# Patient Record
Sex: MALE | Race: WHITE | NOT HISPANIC OR LATINO | Employment: OTHER | URBAN - METROPOLITAN AREA
[De-identification: names, ages, dates, MRNs, and addresses within clinical notes are randomized per-mention and may not be internally consistent; named-entity substitution may affect disease eponyms.]

---

## 2023-11-13 ENCOUNTER — OFFICE VISIT (OUTPATIENT)
Dept: INTERNAL MEDICINE | Facility: CLINIC | Age: 58
End: 2023-11-13
Payer: MEDICARE

## 2023-11-13 VITALS
HEART RATE: 72 BPM | SYSTOLIC BLOOD PRESSURE: 106 MMHG | TEMPERATURE: 97.3 F | HEIGHT: 65 IN | BODY MASS INDEX: 19.76 KG/M2 | DIASTOLIC BLOOD PRESSURE: 68 MMHG | WEIGHT: 118.6 LBS | RESPIRATION RATE: 18 BRPM

## 2023-11-13 DIAGNOSIS — E04.1 THYROID NODULE: ICD-10-CM

## 2023-11-13 DIAGNOSIS — Z12.5 ENCOUNTER FOR PROSTATE CANCER SCREENING: ICD-10-CM

## 2023-11-13 DIAGNOSIS — M25.511 ACUTE PAIN OF RIGHT SHOULDER: ICD-10-CM

## 2023-11-13 DIAGNOSIS — M54.6 MIDLINE THORACIC BACK PAIN, UNSPECIFIED CHRONICITY: ICD-10-CM

## 2023-11-13 DIAGNOSIS — R05.3 CHRONIC COUGH: Primary | ICD-10-CM

## 2023-11-13 DIAGNOSIS — R39.198 DECREASED URINE STREAM: ICD-10-CM

## 2023-11-13 DIAGNOSIS — N32.89 BLADDER WALL THICKENING: ICD-10-CM

## 2023-11-13 DIAGNOSIS — R63.4 WEIGHT LOSS: ICD-10-CM

## 2023-11-13 LAB
BILIRUB BLD-MCNC: NEGATIVE MG/DL
CLARITY, POC: CLEAR
COLOR UR: YELLOW
EXPIRATION DATE: NORMAL
GLUCOSE UR STRIP-MCNC: NEGATIVE MG/DL
KETONES UR QL: NEGATIVE
LEUKOCYTE EST, POC: NEGATIVE
Lab: NORMAL
NITRITE UR-MCNC: NEGATIVE MG/ML
PH UR: 7 [PH] (ref 5–8)
PROT UR STRIP-MCNC: NEGATIVE MG/DL
PSA SERPL-MCNC: 1.24 NG/ML (ref 0–4)
RBC # UR STRIP: NEGATIVE /UL
SP GR UR: 1.01 (ref 1–1.03)
T3 SERPL-MCNC: 82 NG/DL (ref 80–200)
TSH SERPL DL<=0.05 MIU/L-ACNC: 0.61 UIU/ML (ref 0.27–4.2)
UROBILINOGEN UR QL: NORMAL

## 2023-11-13 PROCEDURE — 84480 ASSAY TRIIODOTHYRONINE (T3): CPT | Performed by: NURSE PRACTITIONER

## 2023-11-13 PROCEDURE — 99204 OFFICE O/P NEW MOD 45 MIN: CPT | Performed by: NURSE PRACTITIONER

## 2023-11-13 PROCEDURE — 81003 URINALYSIS AUTO W/O SCOPE: CPT | Performed by: NURSE PRACTITIONER

## 2023-11-13 PROCEDURE — 84443 ASSAY THYROID STIM HORMONE: CPT | Performed by: NURSE PRACTITIONER

## 2023-11-13 PROCEDURE — G0103 PSA SCREENING: HCPCS | Performed by: NURSE PRACTITIONER

## 2023-11-13 RX ORDER — DOXAZOSIN MESYLATE 4 MG/1
4 TABLET ORAL NIGHTLY
Qty: 30 TABLET | Refills: 1 | Status: SHIPPED | OUTPATIENT
Start: 2023-11-13

## 2023-11-13 RX ORDER — NAPROXEN SODIUM 220 MG
220 TABLET ORAL 2 TIMES DAILY PRN
COMMUNITY

## 2023-11-13 RX ORDER — TADALAFIL 5 MG/1
5 TABLET ORAL DAILY PRN
Qty: 30 TABLET | Refills: 2 | Status: SHIPPED | OUTPATIENT
Start: 2023-11-13

## 2023-11-13 NOTE — PROGRESS NOTES
"  New Patient Office Visit      Patient Name: Aashish Maldonado  : 1965   MRN: [id number]     Chief Complaint:    Chief Complaint   Patient presents with    Shoulder Pain       History of Present Illness: Aashish Maldonado is a 58 y.o. male presents to clinic today to establish care. He was evaluated in the emergency department for near-syncope on 2023, acute pancreatitis on 10/03/2023, and right shoulder/back pain on 2023.    Right shoulder/neck/thoracic back pain  On 2023, the patient presented to the emergency department complaining of right shoulder pain for several days. He believed that on the night of 2023 his right \"artificial\" shoulder dislocated, which he reduced himself. He noted progressively worsening pain in the right shoulder/neck/thoracic back since then. His right shoulder x-ray revealed no fractures, and cervical spine CT scan demonstrated multilevel degenerative changes. Today, the patient denies being established with an orthopedist. He reports a history of 2 right shoulder arthroplasties. 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 His \"whole arm just went down,\" and he could not move his right upper extremity. He smokes marijuana for pain; however, this was unhelpful, and his right shoulder pain progressively worsened. He reports intermittent arm numbness/tingling. The patient notes pain radiating from his neck to posterior head, described as the sensation of blood rushing, and notes that his neck pain seems to have been aggravated by re-injuring his right shoulder. He denies associated nausea or emesis. He experiences occasional stabbing, thoracic back pain which radiates through bilateral shoulders and into his neck. His pain affects his entire right shoulder and radiates into his triceps. The patient is scheduled for evaluation with neurosurgery on 2023. He requests referral to address his right shoulder issues. He attempted to perform " "right shoulder exercises with pulleys and bands and could not complete them.    Acute pancreatitis  The patient presented to the emergency department on 10/03/2023 with right abdominal/flank pain and was diagnosed with acute pancreatitis. He had a prior history of acute pancreatitis. Today, he states that his abdominal pain has resolved, and he is drinking and eating adequately. He reports that the suspected cause of his pancreatitis was past excess alcohol consumption.    Bilateral thyroid nodules  The patient reports undergoing neck and head CT angiograms in [place], [place], due to injury/shooting pain. He was advised to follow up with someone for \"nodules on [his] thyroid.\" He notes that he weighed 180 pounds 2 years ago and weighs approximately 118 pound today. The patient notes stress over the past 2 years, including a divorce; however, he reports eating and not re-gaining weight. He has dysphagia, which he attributes to prior Nissen fundoplication. He cannot swallow \"regular pills\" and expects to require repeat esophageal dilation, which was last performed over 3 to 4 years ago. The patient notes recent difficulty consuming steak. He denies nausea, emesis, and is in \"good spirits.\" He reports receiving his last COVID-19 vaccine at approximately the time he began to lose weight.    Bladder wall thickening, decreased urine stream  The patient's abdominal/pelvic CT scan on 10/03/2023 demonstrated bladder wall thickening. He reports normal PSAs in the past. He states that in spring of 2023 in [place], he was evaluated by a urologist, underwent testing, and UroLift or other similar procedure was planned. The patient restarted smoking marijuana and did not go through with the urology procedure. He requests urology referral. He denies hematuria. The patient notes intermittent decreased urine stream. He has taken tadalafil alone as well as tadalafil and doxazosin and states that his urine stream improved when he " took both tadalafil 5 mg daily and doxazosin 8 mg daily. He requests refills. The patient reports taking doxazosin 4 mg daily initially. He denies hypotension and reports tolerating doxazosin.    Past medical history  The patient denies any history of cancer. He took buspirone regularly in the past and currently feels well without this.    Past surgical history  The patient reports undergoing 2 wrist surgeries which involved screws prior to fusion. He confirms adequate sensation in his right hand/wrist. He reports undergoing prior colonoscopy and being recommended 5-year followup. The patient has undergone multiple esophagogastroduodenoscopy including with his last colonoscopy.    Social history  The patient is officially disabled/retired and works in construction. He recently restarted smoking marijuana for pain and is trying to switch to edibles. He smokes cigarettes and notes an associated chronic intermittent cough. The patient denies ever undergoing low-dose chest CT scan for lung cancer screening. He denies significant dyspnea with ambulation. He denies current alcohol intake and notes 20 years of alcohol sobriety, aside from 3 lapses of less than 1 week.       Subjective     Review of System: Review of Systems   Constitutional:  Positive for unexpected weight change.   HENT:  Positive for trouble swallowing.    Respiratory:  Positive for cough.    Genitourinary:  Positive for decreased urine volume.   Musculoskeletal:         Positive for right shoulder/neck/thoracic back pain.   Neurological:  Positive for numbness.      A review of systems was performed, and the pertinent positives are noted in the HPI.      Past Medical History:   Past Medical History:   Diagnosis Date    Acid reflux     ADHD     Double vision     PTSD (post-traumatic stress disorder)     Urinary retention        Past Surgical History:   Past Surgical History:   Procedure Laterality Date    ESOPHAGUS SURGERY      EYE SURGERY      WRIST  "FUSION         Family History:   Family History   Problem Relation Age of Onset    Diabetes Father        Social History:   Social History     Socioeconomic History    Marital status:    Tobacco Use    Smoking status: Every Day     Packs/day: 0.50     Years: 40.00     Additional pack years: 0.00     Total pack years: 20.00     Types: Cigarettes    Smokeless tobacco: Never   Substance and Sexual Activity    Alcohol use: Never    Drug use: Yes     Types: Marijuana, Amphetamines    Sexual activity: Defer       Medications:     Current Outpatient Medications:     naproxen sodium (ALEVE) 220 MG tablet, Take 1 tablet by mouth 2 (Two) Times a Day As Needed., Disp: , Rfl:     doxazosin (Cardura) 4 MG tablet, Take 1 tablet by mouth Every Night., Disp: 30 tablet, Rfl: 1    tadalafil (Cialis) 5 MG tablet, Take 1 tablet by mouth Daily As Needed for Erectile Dysfunction., Disp: 30 tablet, Rfl: 2    Allergies:   Allergies   Allergen Reactions    Dextromethorphan Hives       Objective     Physical Exam:   Vital Signs:   Vitals:    11/13/23 1032   BP: 106/68   BP Location: Right arm   Patient Position: Sitting   Cuff Size: Adult   Pulse: 72   Resp: 18   Temp: 97.3 °F (36.3 °C)   TempSrc: Infrared   Weight: 53.8 kg (118 lb 9.6 oz)   Height: 164.5 cm (64.75\")   PainSc:   7     Body mass index is 19.89 kg/m². BMI is within normal parameters. No other follow-up for BMI required.      Physical Exam  Constitutional:       General: He is not in acute distress.     Appearance: He is not ill-appearing.   HENT:      Head: Normocephalic.   Cardiovascular:      Rate and Rhythm: Normal rate and regular rhythm.      Heart sounds: Normal heart sounds. No murmur heard.  Pulmonary:      Comments: Diminished breath sounds  Abdominal:      General: Bowel sounds are normal.      Tenderness: There is no abdominal tenderness.   Musculoskeletal:      Comments: Generalized tenderness of right shoulder with decreased range of motion, flexion to 90 " degrees. Brisk capillary refill in bilateral extremities and sensation is intact. Upper thoracic spine tenderness.   Neurological:      General: No focal deficit present.      Mental Status: He is oriented to person, place, and time.   Psychiatric:         Mood and Affect: Mood normal.         Assessment / Plan      Assessment/Plan:   Diagnoses and all orders for this visit:    1. Chronic cough (Primary)  -     CT Chest Without Contrast; Future    2. Acute pain of right shoulder  -     Ambulatory Referral to Orthopedic Surgery    3. Thyroid nodule  -     TSH Rfx On Abnormal To Free T4; Future  -     T3; Future  -     Cancel: US Thyroid  -     US Thyroid; Future  -     TSH Rfx On Abnormal To Free T4  -     T3    4. Weight loss  -     CT Chest Without Contrast; Future    5. Encounter for prostate cancer screening  -     PSA SCREENING; Future  -     PSA SCREENING    6. Decreased urine stream  -     POC Urinalysis Dipstick, Automated  -     tadalafil (Cialis) 5 MG tablet; Take 1 tablet by mouth Daily As Needed for Erectile Dysfunction.  Dispense: 30 tablet; Refill: 2  -     doxazosin (Cardura) 4 MG tablet; Take 1 tablet by mouth Every Night.  Dispense: 30 tablet; Refill: 1  -     Ambulatory Referral to Urology    7. Bladder wall thickening  -     Ambulatory Referral to Urology    8. Midline thoracic back pain, unspecified chronicity  -     XR Spine Thoracic 3 View; Future         1. Right shoulder pain  - His right shoulder x-ray on 11/07/2023 revealed postoperative changes, mild acromioclavicular joint degenerative changes, no soft tissue abnormality, no foreign body, no evidence of acute fracture or   dislocation.  - Orthopedic surgery referral was placed.    2. Neck pain  - Cervical spine CT scan on 11/07/2023 demonstrated grade 1 anterolisthesis at C2 on C3, moderate to   severe degenerative changes in the cervical spine spanning C3 to C7, osseous fusion of the left facets of C2 and C3, facet arthropathy in the  right side at C2-C3, advanced facet arthropathy at C4-C5 especially on the left side, mild central spinal canal narrowing suggested at C5-C6, with anteroposterior measurement of 8 mm, multilevel neuroforaminal stenosis.   - He will follow up with neurosurgery as scheduled on 11/14/2023.    3. Thoracic back pain  - Thoracic spine x-ray will be obtained.    4. History of pancreatitis  - CT scan of the abdomen and pelvis with contrast on 10/03/2023 demonstrated liver without suspicious focal hepatic lesion, unremarkable gallbladder, no significant biliary ductal dilatation. Bilateral adrenal glands were unremarkable without suspicious lesion. There were no suspicious renal lesions, no hydronephrosis. There was mild urinary bladder wall thickening and a large bladder diverticulum arising from the posterior aspect of the bladder measuring approximately 6.6 x 4.4 cm. No pathologically enlarged abdominal or pelvic lymph nodes were present.  No evidence of bowel obstruction. Appendix was unremarkable. There was a relative paucity of abdominal and retroperitoneal fat though maybe some mild peripancreatic inflammation. Peritoneum and pelvic viscera revealed no acute findings. Bones demonstrated to acute fracture.     5. Bilateral thyroid nodules  - CT angiogram of the neck on 08/03/2023 demonstrated small bilateral isodense and hypodense thyroid gland nodules.  - Thyroid function studies and thyroid ultrasound will be completed. Scheduling phone number was provided.    6. Bladder wall thickening, decreased urine stream  - Urinalysis and PSA will be completed.  - Urology referral was placed.  - Tadalafil 5 mg daily and doxazosin 4 mg daily were prescribed. Potential side effect of hypotension was discussed. He was advised to start tadalafil alone for a few days, then start doxazosin, and monitor for hypotension.    7. Unintentional weight loss  - Chest CT scan was ordered since symptomatic cough and high risk  based on tobacco  use history.  Chest xray: 10/3/23: Emphysema. No dense consolidation. No pneumothorax or pleural effusion.   Normal heart size.     8. Chronic cough  - Chest x-ray on 10/03/2023 demonstrated emphysema, no dense consolidation, no pneumothorax or pleural effusion.    9. Tobacco use  - Smoking cessation was recommended. He declines nicotine patches or other tobacco cessation aids at this time.    10. Health maintenance  - Laboratory studies were recently completed in the emergency department. The patient will follow up in 1 month for annual physical examination and repeat laboratory studies if needed.    I explained and discussed patient's condition and plan of care.  Discussed when to follow-up.  Discussed possible red flags and how to follow-up with those.  Viewed patient's medications and discussed common side effects. Patient to continue current medications as advised.  Be compliant with medications. Patient to let me know if he has any changes in health, does not tolerate medication, or any future concerns about treatment. Patient verbalized understanding and agreement with plan of care.   Patient given the scheduling number, [phone] for tests, or [phone] for internal referrals.  If they do not receive a call for their test in 1-2 weeks they should call scheduling. Scheduling will make three attempts to reach the patient, if unable to reach the patient after 3 attempts, the order will be canceled. Patient verbalized an understanding and agreement with plan.    Follow Up:   Return in about 4 weeks (around 12/11/2023) for Annual.    HARI Jones  [facility]    Transcribed from ambient dictation for HARI Jones by Courtney Woodard.  11/13/23   14:48 EST    Patient or patient representative verbalized consent to the visit recording.  I have personally performed the services described in this document as transcribed by the above individual, and it is both  accurate and complete.

## 2023-11-14 ENCOUNTER — OFFICE VISIT (OUTPATIENT)
Dept: ORTHOPEDIC SURGERY | Facility: CLINIC | Age: 58
End: 2023-11-14
Payer: MEDICARE

## 2023-11-14 VITALS
WEIGHT: 118 LBS | HEIGHT: 65 IN | DIASTOLIC BLOOD PRESSURE: 84 MMHG | BODY MASS INDEX: 19.66 KG/M2 | SYSTOLIC BLOOD PRESSURE: 110 MMHG

## 2023-11-14 DIAGNOSIS — Z72.0 TOBACCO USE: ICD-10-CM

## 2023-11-14 DIAGNOSIS — M47.812 CERVICAL SPINE ARTHRITIS: ICD-10-CM

## 2023-11-14 DIAGNOSIS — M25.511 ACUTE PAIN OF RIGHT SHOULDER: Primary | ICD-10-CM

## 2023-11-14 DIAGNOSIS — Z98.890 HISTORY OF REVERSE TOTAL REPLACEMENT OF RIGHT SHOULDER JOINT: ICD-10-CM

## 2023-11-14 NOTE — PROGRESS NOTES
[facility] Office Visit - Pamella Dean PA-C    Office Visit       Patient Name: Aashish Maldonado    Chief Complaint:   Chief Complaint   Patient presents with    Right Shoulder - Pain       Referring Physician: Anna Gamez APRN    History of Present Illness:   Aashish Maldonado is a 58 y.o. male who presents with right shoulder pain.  Ongoing for the last couple weeks.  Rates pain an 8/10.  Described as aching, stabbing, shooting pain.  Associated with stiffness.  Worsens with any movement of the joint.  He has tried ibuprofen, Aleve, Tylenol, Flexeril, diclofenac for the pain.  He has had 2 previous shoulder surgeries with Dr. Portillo in [facility].  Original total shoulder arthroplasty in 2019.  He then had right total shoulder conversion to reverse a couple years later.  He has not been back to see Dr. Portillo recently.    He reports on 11/4/2023 he was working for several hours on house projects when he felt a sudden pain in the right shoulder.  He had been working for almost 48 hours straight. He does not recall a specific injury.  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  He went to the [facility] emergency department at that time.  He was encouraged to follow-up with a neurosurgeon due to C-spine degenerative changes found at that time.  He says he has an appointment with a neurosurgeon next week.    Currently not working full time.  He moved to [place] earlier this year.      Subjective     Review of Systems   Constitutional: Negative.  Negative for chills, fatigue and fever.   HENT: Negative.  Negative for congestion and dental problem.    Eyes: Negative.  Negative for blurred vision.   Respiratory: Negative.  Negative for shortness of breath.    Cardiovascular: Negative.  Negative for leg swelling.   Gastrointestinal: Negative.  Negative for abdominal pain.   Endocrine: Negative.  Negative for polyuria.    Genitourinary: Negative.  Negative for difficulty urinating.   Musculoskeletal:  Positive for arthralgias.   Skin: Negative.    Allergic/Immunologic: Negative.    Neurological: Negative.    Hematological: Negative.  Negative for adenopathy.   Psychiatric/Behavioral: Negative.  Negative for behavioral problems.         Past Medical History:   Past Medical History:   Diagnosis Date    Acid reflux     ADHD     Double vision     PTSD (post-traumatic stress disorder)     Urinary retention        Past Surgical History:   Past Surgical History:   Procedure Laterality Date    ESOPHAGUS SURGERY      EYE SURGERY      WRIST FUSION         Family History:   Family History   Problem Relation Age of Onset    Diabetes Father        Social History:   Social History     Socioeconomic History    Marital status:    Tobacco Use    Smoking status: Every Day     Packs/day: 0.50     Years: 40.00     Additional pack years: 0.00     Total pack years: 20.00     Types: Cigarettes    Smokeless tobacco: Never   Substance and Sexual Activity    Alcohol use: Never    Drug use: Yes     Types: Marijuana, Amphetamines    Sexual activity: Defer       Medications:   Current Outpatient Medications:     doxazosin (Cardura) 4 MG tablet, Take 1 tablet by mouth Every Night., Disp: 30 tablet, Rfl: 1    naproxen sodium (ALEVE) 220 MG tablet, Take 1 tablet by mouth 2 (Two) Times a Day As Needed., Disp: , Rfl:     tadalafil (Cialis) 5 MG tablet, Take 1 tablet by mouth Daily As Needed for Erectile Dysfunction., Disp: 30 tablet, Rfl: 2    Allergies:   Allergies   Allergen Reactions    Dextromethorphan Hives       I have reviewed and updated the following portions of the patient's history and review of systems: allergies, current medications, past family history, past medical history, past social history, past surgical history and problem list.    Objective      Vital Signs:   Vitals:    11/14/23 0930   BP: 110/84   Weight: 53.5 kg (118 lb)   Height:  "164.5 cm (64.76\")       Ortho Exam:  General: no acute distress, comfortable  Vitals reviewed in chart    Musculoskeletal Exam    SIDE: Right shoulder  Shoulder Exam:    Tenderness: No focal tenderness--diffusely painful    Range of motion measurements (degrees)  Forward flexion/Abduction/External rotation at side/ER at 90/IR at 90/IR position  Active: 120/120/60/60  Passive: 140/120/60/60    Painful arc of motion: Yes, dysrhythmic motion  No evidence of septic joint  Pain with forward flexion and abduction greater: Yes  Impingement test: Painful    Rotator Cuff Testing:  Tenderness to palpation at rotator cuff -yes  Rotator cuff testing Roque's test -painful  Rotator cuff testing External rotation -painful  Rotator cuff testing Lag signs -negative  Pain with abduction great than 90 degrees -yes    Long head of the biceps testing:  Aguilar's test for biceps -negative  Bicipital groove tenderness to palpation -negative for  Speed's test  -negative    AC Joint:  AC joint tenderness to palpation -no  AC joint Prominence -no      Results Review:   XR Shoulder 2+ View Right  Imaging: shoulder x-rays 3 views - AP, axillary, and scapular-Y x-ray   views    Side: Right shoulder    Indication for shoulder x-ray 3 views: shoulder pain    Comparison: None    Findings: No acute bony pathology-no obvious periprosthetic fracture.    Right reverse shoulder arthroplasty show the implants to be located.  No   obvious lucency on the humeral or the glenoid side.  Short canal filling   press-fit stem.  Early inferior glenoid notching.    I personally reviewed the above x-rays.         Assessment / Plan      Assessment:  Diagnoses and all orders for this visit:    1. Acute pain of right shoulder (Primary)  -     XR Shoulder 2+ View Right    2. 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 Cervical spine arthritis    4. Tobacco use        Quality Metrics:   BMI:   BMI is within normal parameters. No other follow-up for " BMI required.       Tobacco:   Aashish Maldonado  reports that he has been smoking cigarettes. He has a 20.00 pack-year smoking history. He has never used smokeless tobacco..     Plan:  X-ray films reviewed today with Dr. Gibson.  Evidence of right reverse total shoulder arthroplasty.  No acute changes.  No obvious fracture.  Clinically he has no focal tenderness of scapular spine or acromion.  I have low suspicion for stress fracture at this time.  No further imaging needed currently.  Possible CT scan if symptoms persist.  He is likely experiencing pain from overuse of right shoulder while working long period. No specific trauma or injury.   Recommend continue anti-inflammatories as needed.  Rest and icing will be critical to allow pain to settle down.  Reviewed emergency department notes from 11/7/2023.  Referred to neurosurgeon at that time due to degenerative changes of C-spine.  He has appointment next week.  He will keep me updated regarding right shoulder pain after he sees neurosurgeon.  Pain is likely radiating from his neck as well.  We will check in again in a couple months to see if further treatment or imaging is needed.      Follow Up:   2-3 months as needed    History, diagnosis and treatment plan discussed with Dr. Gibson.      Pamella Dean PA-C  [facility]       Dictated using Dragon Speech Recognition.

## 2023-12-06 ENCOUNTER — OFFICE VISIT (OUTPATIENT)
Dept: UROLOGY | Facility: CLINIC | Age: 58
End: 2023-12-06
Payer: MEDICARE

## 2023-12-06 VITALS
DIASTOLIC BLOOD PRESSURE: 74 MMHG | WEIGHT: 119 LBS | OXYGEN SATURATION: 98 % | BODY MASS INDEX: 19.83 KG/M2 | SYSTOLIC BLOOD PRESSURE: 132 MMHG | HEIGHT: 65 IN | HEART RATE: 67 BPM

## 2023-12-06 DIAGNOSIS — N52.9 ERECTILE DYSFUNCTION, UNSPECIFIED ERECTILE DYSFUNCTION TYPE: ICD-10-CM

## 2023-12-06 DIAGNOSIS — M54.6 MIDLINE THORACIC BACK PAIN, UNSPECIFIED CHRONICITY: ICD-10-CM

## 2023-12-06 DIAGNOSIS — R39.198 DECREASED URINE STREAM: ICD-10-CM

## 2023-12-06 DIAGNOSIS — R39.9 LOWER URINARY TRACT SYMPTOMS (LUTS): Primary | ICD-10-CM

## 2023-12-06 DIAGNOSIS — N32.89 BLADDER WALL THICKENING: ICD-10-CM

## 2023-12-06 LAB
BILIRUB BLD-MCNC: NEGATIVE MG/DL
CLARITY, POC: CLEAR
COLOR UR: YELLOW
EXPIRATION DATE: ABNORMAL
GLUCOSE UR STRIP-MCNC: NEGATIVE MG/DL
KETONES UR QL: NEGATIVE
LEUKOCYTE EST, POC: NEGATIVE
Lab: ABNORMAL
NITRITE UR-MCNC: NEGATIVE MG/ML
PH UR: 7.5 [PH] (ref 5–8)
PROT UR STRIP-MCNC: ABNORMAL MG/DL
RBC # UR STRIP: NEGATIVE /UL
SP GR UR: 1.01 (ref 1–1.03)
UROBILINOGEN UR QL: NORMAL

## 2023-12-06 RX ORDER — TADALAFIL 5 MG/1
5 TABLET ORAL DAILY PRN
Qty: 30 TABLET | Refills: 2 | Status: SHIPPED | OUTPATIENT
Start: 2023-12-06

## 2023-12-06 RX ORDER — DOXAZOSIN MESYLATE 4 MG/1
8 TABLET ORAL NIGHTLY
Qty: 90 TABLET | Refills: 1 | Status: SHIPPED | OUTPATIENT
Start: 2023-12-06

## 2023-12-06 NOTE — PROGRESS NOTES
[facility] Male Office Visit      Patient Name: Aashish Maldonado  : 1965   MRN: [id number]     Chief Complaint:  Lower Urinary Tract Symptoms.   Chief Complaint   Patient presents with    Decreased Urine stream    Bladder wall thickening        Referring Provider: Anna Gamez APRN    History of Present Illness: Mr. Maldonado is a 58 y.o. male who presents to establish urologic care for ongoing issues with lower urinary tract symptoms.  He recently moved to [facility] from the [place] area.  PMH significant for ADHD, PTSD, GERD, esophageal surgery, wrist surgery.  Patient states that he has had progressive issues with difficulty starting stream, urgency, nocturia.  States that he wakes up approximately every hour to have to void.  Does endorse drinking 4-6 red bull energy drinks a day as well as smoking 1/2 pack/day of cigarettes and some occasional marijuana use.  Denies any family history of prostate cancer, hematuria, hematospermia, history of STDs.  States that he has seen the urologist at the [facility] this year.  He was started on medical management consisting of doxazosin.  He does state that this does help some with his symptoms but they do persist.  He states that there was some discussion about an intervention such as UroLift, but denies that he had any formal work-up such as a cystoscopy, TRUS, uroflow.  UA today in the office shows some trace protein otherwise unremarkable.  IPSS 19.      Lab Results   Component Value Date    PSA 1.240 2023    PSA 1.1 2022    PSA 1.0 2020       Subjective      Review of System: Review of Systems   Genitourinary:  Positive for urgency.      I have reviewed the ROS documented by my clinical staff, I have updated appropriately and I agree. Ken Purcell MD    Past Medical History:  Past Medical History:   Diagnosis Date    Acid reflux     ADHD     Double vision     PTSD (post-traumatic stress disorder)      Urinary retention        Past Surgical History:  Past Surgical History:   Procedure Laterality Date    ESOPHAGUS SURGERY      EYE SURGERY      WRIST FUSION         Medications:    Current Outpatient Medications:     doxazosin (Cardura) 4 MG tablet, Take 2 tablets by mouth Every Night., Disp: 90 tablet, Rfl: 1    naproxen sodium (ALEVE) 220 MG tablet, Take 1 tablet by mouth 2 (Two) Times a Day As Needed., Disp: , Rfl:     tadalafil (Cialis) 5 MG tablet, Take 1 tablet by mouth Daily As Needed for Erectile Dysfunction., Disp: 30 tablet, Rfl: 2    Allergies:  Allergies   Allergen Reactions    Dextromethorphan Hives       Social History:  Social History     Socioeconomic History    Marital status:    Tobacco Use    Smoking status: Every Day     Packs/day: 0.50     Years: 40.00     Additional pack years: 0.00     Total pack years: 20.00     Types: Cigarettes    Smokeless tobacco: Never   Vaping Use    Vaping Use: Never used   Substance and Sexual Activity    Alcohol use: Never    Drug use: Yes     Types: Marijuana, Amphetamines    Sexual activity: Defer       Family History:  Family History   Problem Relation Age of Onset    Diabetes Father        IPSS Questionnaire (AUA-7):  Over the past month…    1)  Incomplete Emptying:       How often have you had a sensation of not emptying you had the sensation of not emptying your bladder completely after you finished urinating?  4 - More than half the time   2)  Frequency:       How often have you had the urinate again less than two hours after you finished urinating?  2 - Less than half the time   3)  Intermittency:       How often have you found you stopped and started again several times when you urinated?   3 - About half the time   4) Urgency:      How often have you found it difficult to postpone urination?  4 - More than half the time   5) Weak Stream:      How often have you had a weak urinary stream?  2 - Less than half the time   6) Straining:       How often  "have you had to push or strain to begin urination?  2 - Less than half the time   7) Nocturia:      How many times did you most typically get up to urinate from the time you went to bed at night until the time you got up in the morning?  2 - 2 times   Total Score:  19   The International Prostate Symptom Score (IPSS) is used to screen, diagnose, track symptoms of benign prostatic hyperplasia (BPH).   0-7 (Mild Symptoms) 8-19 (Moderate) 20-35 (Severe)   Quality of Life (QoL):  If you were to spend the rest of your life with your urinary condition just the way it is now, how would you feel about that? 3-Mixed   Urine Leakage (Incontinence) 0-No Leakage       Sexual Health Inventory for Men (ERIN)   Over the past 6 months:     1. How do you rate your confidence that you could get and keep an erection?  5 - Very high    2. When you had erections with sexual  stimulation, how often were your erections hard enough for penetration (entering your partner)?  5 - Almost always or always    3. During sexual intercourse, how often were you able to maintain your erection after you had penetrated (entered) your partner?  5 - Almost always or always    4. During sexual intercourse, how difficult was it to maintain your erection to completion of intercourse?  5 - Not difficult    5. When you attempted sexual intercourse, how often was it satisfactory for you?  5 - Almost always or always     Total Score: 25   The Sexual Health Inventory for Men further classifies ED severity with the following breakpoints:   1-7 (Severe ED) 8-11 (Moderate ED) 12-16 (Mild to Moderate ED) 17-21 (Mild ED)      Post void residual bladder scan:   135 mL     Objective     Physical Exam:   Vital Signs:   Vitals:    12/06/23 0912   BP: 132/74   Pulse: 67   SpO2: 98%   Weight: 54 kg (119 lb)   Height: 164.5 cm (64.76\")   PainSc: 0-No pain     Body mass index is 19.95 kg/m².     Physical Exam  Constitutional:       Appearance: Normal appearance.   HENT:      " Head: Normocephalic and atraumatic.   Musculoskeletal:         General: Normal range of motion.      Cervical back: Normal range of motion.   Skin:     General: Skin is dry.   Neurological:      Mental Status: He is alert and oriented to person, place, and time.   Psychiatric:         Mood and Affect: Mood normal.         Behavior: Behavior normal.         Labs:   Lab Results   Component Value Date    PSA 1.240 11/13/2023    PSA 1.1 05/23/2022    PSA 1.0 06/23/2020       Brief Urine Lab Results  (Last result in the past 365 days)        Color   Clarity   Blood   Leuk Est   Nitrite   Protein   CREAT   Urine HCG        12/06/23 0925 Yellow   Clear   Negative   Negative   Negative   Trace                        Lab Results   Component Value Date    CALCIUM 9.2 08/03/2023     08/03/2023    K 3.7 08/03/2023    CO2 27 08/03/2023     08/03/2023    BUN 16 08/03/2023    CREATININE 0.9 08/03/2023    BCR 18 08/03/2023    ANIONGAP 6 08/03/2023       Lab Results   Component Value Date    WBC 5.9 05/23/2022    HGB 15.3 05/23/2022    HCT 43.4 05/23/2022    MCV 94.9 05/23/2022     05/23/2022         Measures:   Tobacco:   Aashish Maldonado  reports that he has been smoking cigarettes. He has a 20.00 pack-year smoking history. He has never used smokeless tobacco..       Assessment / Plan      Assessment:  Mr. Maldonado is a 58 y.o. male who presented today with lower urinary tract symptoms.  Symptoms appear to be consistent with BPH.  We did review the read of his CT abdomen pelvis that was performed at [facility] for work-up of pancreatitis back in October.  It did show a large bladder diverticulum measuring 6.6 x 4.4 cm.  This is highly likely due to bladder outlet obstruction.  Patient does desire definitive treatment to be off medications to help with his lower urinary tract symptoms.  Discussed further work-up including cystoscopy, uroflow, TRUS.  We will plan to perform this in our procedure clinic next  week.  In the meantime, we will refill his doxazosin and Cialis.  Final recommendations pending results of his work-up next week.  Patient is happy with this plan.    Risks of cystoscopy include dysuria, bleeding, infection.       BPH with urinary symptoms  -Continue doxazosin 8 mg daily in addition to as needed as needed 5 mg Cialis for BPH/ED symptoms  -Next available cystoscopy, TRUS prostate sizing, uroflow and repeat PVR in procedure clinic    2. Bladder diverticulum  -Large bladder diverticulum needs assessment on cystoscopy, we discussed possible bladder diverticulectomy if necessary if down to be poorly draining      Follow Up:   Return in about 6 days (around 12/12/2023) for Flex cystoscopy, TRUS prostate sizing, Uroflow, PVR Tuesday 12/12/23.    I spent approximately 45 minutes providing clinical care for this patient; including review of patient's chart and provider documentation, face to face time spent with patient in examination room (obtaining history, performing physical exam, discussing diagnosis and management options), placing orders, and completing patient documentation.     Ken Purcell MD  [facility]

## 2023-12-20 ENCOUNTER — HOSPITAL ENCOUNTER (OUTPATIENT)
Dept: CT IMAGING | Facility: HOSPITAL | Age: 58
Discharge: HOME | End: 2023-12-20
Payer: MEDICARE

## 2023-12-20 ENCOUNTER — OFFICE VISIT (OUTPATIENT)
Dept: INTERNAL MEDICINE | Facility: CLINIC | Age: 58
End: 2023-12-20
Payer: MEDICARE

## 2023-12-20 ENCOUNTER — TELEPHONE (OUTPATIENT)
Dept: INTERNAL MEDICINE | Facility: CLINIC | Age: 58
End: 2023-12-20

## 2023-12-20 ENCOUNTER — APPOINTMENT (OUTPATIENT)
Dept: ULTRASOUND IMAGING | Facility: HOSPITAL | Age: 58
End: 2023-12-20
Payer: MEDICARE

## 2023-12-20 ENCOUNTER — HOSPITAL ENCOUNTER (OUTPATIENT)
Dept: ULTRASOUND IMAGING | Facility: HOSPITAL | Age: 58
Discharge: HOME | End: 2023-12-20
Payer: MEDICARE

## 2023-12-20 VITALS
WEIGHT: 124.6 LBS | DIASTOLIC BLOOD PRESSURE: 72 MMHG | BODY MASS INDEX: 20.76 KG/M2 | HEART RATE: 76 BPM | RESPIRATION RATE: 20 BRPM | TEMPERATURE: 96.6 F | HEIGHT: 65 IN | SYSTOLIC BLOOD PRESSURE: 102 MMHG

## 2023-12-20 DIAGNOSIS — E04.1 THYROID NODULE: ICD-10-CM

## 2023-12-20 DIAGNOSIS — F41.9 ANXIETY: ICD-10-CM

## 2023-12-20 DIAGNOSIS — R63.4 WEIGHT LOSS: ICD-10-CM

## 2023-12-20 DIAGNOSIS — R05.3 CHRONIC COUGH: ICD-10-CM

## 2023-12-20 DIAGNOSIS — K20.90 ESOPHAGITIS: ICD-10-CM

## 2023-12-20 DIAGNOSIS — J06.9 UPPER RESPIRATORY TRACT INFECTION, UNSPECIFIED TYPE: ICD-10-CM

## 2023-12-20 DIAGNOSIS — Z00.00 MEDICARE ANNUAL WELLNESS VISIT, SUBSEQUENT: Primary | ICD-10-CM

## 2023-12-20 DIAGNOSIS — M25.511 ACUTE PAIN OF RIGHT SHOULDER: ICD-10-CM

## 2023-12-20 DIAGNOSIS — M50.90 CERVICAL DISC DISEASE: ICD-10-CM

## 2023-12-20 DIAGNOSIS — Z72.0 TOBACCO ABUSE: ICD-10-CM

## 2023-12-20 PROCEDURE — 71250 CT THORAX DX C-: CPT

## 2023-12-20 PROCEDURE — 76536 US EXAM OF HEAD AND NECK: CPT

## 2023-12-20 RX ORDER — VARENICLINE TARTRATE 0.5 MG/1
TABLET, FILM COATED ORAL
Qty: 11 TABLET | Refills: 0 | Status: SHIPPED | OUTPATIENT
Start: 2023-12-20

## 2023-12-20 RX ORDER — MELOXICAM 7.5 MG/1
7.5 TABLET ORAL DAILY
Qty: 30 TABLET | Refills: 0 | Status: SHIPPED | OUTPATIENT
Start: 2023-12-20

## 2023-12-20 RX ORDER — METHYLPREDNISOLONE 4 MG/1
TABLET ORAL
Qty: 21 TABLET | Refills: 0 | Status: SHIPPED | OUTPATIENT
Start: 2023-12-20

## 2023-12-20 RX ORDER — VARENICLINE TARTRATE 1 MG/1
1 TABLET, FILM COATED ORAL 2 TIMES DAILY
Qty: 180 TABLET | Refills: 0 | Status: SHIPPED | OUTPATIENT
Start: 2023-12-20

## 2023-12-20 RX ORDER — GABAPENTIN 600 MG/1
600 TABLET ORAL 3 TIMES DAILY
COMMUNITY

## 2023-12-20 RX ORDER — AZITHROMYCIN 250 MG/1
TABLET, FILM COATED ORAL
Qty: 6 TABLET | Refills: 0 | Status: SHIPPED | OUTPATIENT
Start: 2023-12-20

## 2023-12-20 RX ORDER — PANTOPRAZOLE SODIUM 40 MG/1
40 TABLET, DELAYED RELEASE ORAL 2 TIMES DAILY
Qty: 180 TABLET | Refills: 0 | Status: SHIPPED | OUTPATIENT
Start: 2023-12-20

## 2023-12-20 NOTE — PROGRESS NOTES
The ABCs of the Annual Wellness Visit  Subsequent Medicare Wellness Visit    Nishant Maldonado is a 58 y.o. male who presents for a Subsequent Medicare Wellness Visit.    The following portions of the patient's history were reviewed and   updated as appropriate: allergies, current medications, past family history, past medical history, past social history, past surgical history, and problem list.    Compared to one year ago, the patient feels his physical   health is the same.    Compared to one year ago, the patient feels his mental   health is better.    Recent Hospitalizations:  He was not admitted to the hospital during the last year.       Current Medical Providers:  Patient Care Team:  Anna Gamez APRN as PCP - General (Nurse Practitioner)    Outpatient Medications Prior to Visit   Medication Sig Dispense Refill    doxazosin (Cardura) 4 MG tablet Take 2 tablets by mouth Every Night. 90 tablet 1    gabapentin (NEURONTIN) 600 MG tablet Take 1 tablet by mouth 3 (Three) Times a Day.      tadalafil (Cialis) 5 MG tablet Take 1 tablet by mouth Daily As Needed for Erectile Dysfunction. 30 tablet 2    naproxen sodium (ALEVE) 220 MG tablet Take 1 tablet by mouth 2 (Two) Times a Day As Needed.       No facility-administered medications prior to visit.       No opioid medication identified on active medication list. I have reviewed chart for other potential  high risk medication/s and harmful drug interactions in the elderly.        Aspirin is not on active medication list.  Aspirin use is not indicated based on review of current medical condition/s. Risk of harm outweighs potential benefits.  .    There is no problem list on file for this patient.    Advance Care Planning   Advance Care Planning     Advance Directive is not on file.  ACP discussion was held with the patient during this visit. Patient does not have an advance directive, information provided.     Objective    Vitals:    12/20/23 0811   BP:  "102/72   BP Location: Right arm   Patient Position: Sitting   Cuff Size: Adult   Pulse: 76   Resp: 20   Temp: 96.6 °F (35.9 °C)   TempSrc: Infrared   Weight: 56.5 kg (124 lb 9.6 oz)   Height: 164.5 cm (64.75\")   PainSc:   7     Estimated body mass index is 20.89 kg/m² as calculated from the following:    Height as of this encounter: 164.5 cm (64.75\").    Weight as of this encounter: 56.5 kg (124 lb 9.6 oz).    BMI is within normal parameters. No other follow-up for BMI required.      Does the patient have evidence of cognitive impairment? No          HEALTH RISK ASSESSMENT    Smoking Status:  Social History     Tobacco Use   Smoking Status Every Day    Packs/day: 0.50    Years: 40.00    Additional pack years: 0.00    Total pack years: 20.00    Types: Cigarettes   Smokeless Tobacco Never     Alcohol Consumption:  Social History     Substance and Sexual Activity   Alcohol Use Never     Fall Risk Screen:    FLORESADI Fall Risk Assessment was completed, and patient is at LOW risk for falls.Assessment completed on:2023    Depression Screenin/20/2023     8:20 AM   PHQ-2/PHQ-9 Depression Screening   Little Interest or Pleasure in Doing Things 1-->several days   Feeling Down, Depressed or Hopeless 1-->several days   Trouble Falling or Staying Asleep, or Sleeping Too Much 1-->several days   Feeling Tired or Having Little Energy 1-->several days   Poor Appetite or Overeating 1-->several days   Feeling Bad about Yourself - or that You are a Failure or Have Let Yourself or Your Family Down 0-->not at all   Trouble Concentrating on Things, Such as Reading the Newspaper or Watching Television 0-->not at all   Moving or Speaking So Slowly that Other People Could Have Noticed? Or the Opposite - Being So Fidgety 0-->not at all   Thoughts that You Would be Better Off Dead or of Hurting Yourself in Some Way 0-->not at all   PHQ-9: Brief Depression Severity Measure Score 5   If You Checked Off Any Problems, How Difficult " Have These Problems Made It For You to Do Your Work, Take Care of Things at Home, or Get Along with Other People? not difficult at all       Health Habits and Functional and Cognitive Screenin/20/2023     8:18 AM   Functional & Cognitive Status   Do you have difficulty preparing food and eating? No   Do you have difficulty bathing yourself, getting dressed or grooming yourself? No   Do you have difficulty using the toilet? No   Do you have difficulty moving around from place to place? No   Do you have trouble with steps or getting out of a bed or a chair? No   Current Diet Limited Junk Food   Dental Exam Not up to date   Eye Exam Up to date   Exercise (times per week) 5 times per week   Current Exercises Include Walking   Do you need help using the phone?  No   Are you deaf or do you have serious difficulty hearing?  No   Do you need help to go to places out of walking distance? No   Do you need help shopping? No   Do you need help preparing meals?  No   Do you need help with housework?  No   Do you need help with laundry? No   Do you need help taking your medications? No   Do you need help managing money? No   Do you ever drive or ride in a car without wearing a seat belt? No   Have you felt unusual stress, anger or loneliness in the last month? No   Who do you live with? Other   If you need help, do you have trouble finding someone available to you? No   Have you been bothered in the last four weeks by sexual problems? No   Do you have difficulty concentrating, remembering or making decisions? No       Age-appropriate Screening Schedule:  Refer to the list below for future screening recommendations based on patient's age, sex and/or medical conditions. Orders for these recommended tests are listed in the plan section. The patient has been provided with a written plan.    Health Maintenance   Topic Date Due    COLORECTAL CANCER SCREENING  Never done    Pneumococcal Vaccine 0-64 (1 - PCV) Never done     TDAP/TD VACCINES (1 - Tdap) Never done    ZOSTER VACCINE (1 of 2) Never done    LUNG CANCER SCREENING  Never done    INFLUENZA VACCINE  08/01/2023    COVID-19 Vaccine (4 - 2023-24 season) 09/01/2023    HEPATITIS C SCREENING  Never done    ANNUAL WELLNESS VISIT  Never done                CMS Preventative Services Quick Reference  Risk Factors Identified During Encounter  Chronic Pain: Home exercise plan outlined.  NSAIDs per medication orders.  Pain Management Referral Ordered  Fall Risk-High or Moderate: Discussed Fall Prevention in the home  Inactivity/Sedentary: Patient was advised to exercise at least 150 minutes a week per CDC recommendations.  Dental Screening Recommended  Vision Screening Recommended    Follow Up:   Next Medicare Wellness visit to be scheduled in 1 year.       Additional E&M Note during same encounter follows:  Patient has multiple medical problems which are significant and separately identifiable that require additional work above and beyond the Medicare Wellness Visit.      Chief Complaint  Medicare Wellness-subsequent    Subjective        HPI  Scot A Maldonado is also being seen today for chronic conditions.        Right shoulder/neck/thoracic back pain  He has seen [facility] for right shoulder pain.  Concern for overuse of right shoulder due to working history and pain from cervical degenerative changes.  They recommended anti-inflammatories, rest and icing.  He will follow-up with him in a couple months.      CT cervical spine without contrast (11/07/2023 18:59)   PROCEDURE: Axial images were obtained from the skull base to the   thoracic inlet by computed tomography. 3 D reconstruction images were   performed. This study was performed with techniques to keep radiation   doses as low as reasonably achievable, (ALARA). Individualized dose   reduction techniques using automated exposure control or adjustment of   mA and/or kV according to the patient size were employed.     FINDINGS:  "There is no acute fracture or daryl subluxation. Moderate   craniocervical junction degenerative changes.   There is grade 1 anterolisthesis at C2 on C3. There are moderate to   severe degenerative changes in the cervical spine spanning C3-C7. There   is osseous fusion of the left facets of C2 and C3. There is facet   arthropathy seen in the right side at C2-C3. There is advanced facet   arthropathy at C4-C5 especially on the left side. Mild central spinal   canal narrowing suggested at C5-C6, with AP measurement of 8 mm.   Multilevel neuroforaminal stenosis.       He saw Dr. Desai neurosurgery at [facility] who referred him to PT/ not a surgical candidate at that time. He did not complete outpatient PT but completed home exercise for neck pain that he found on line including neck stretches, ROM, and  strengthening exercises for 4 weeks. Pain has increased in  cervical and upper thoracic spine radiates out to each side of spine. Pain is sharp but improves with exercise.  He also has a burning component to the pain.  He had an old prescription of gabapentin which did help the burning but did not completely take the pain away.  He noticed last week that he has been dropping things out of his left hands include a cigarette and dropped  his keys. He has been using diclofenac cream with some relief. Alleve and tylenol didn't help     Acute pancreatitis  He had a prior history of acute pancreatitis.  No complaints of abdominal pain today.  And he is drinking and eating adequately.  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         Bilateral thyroid nodules  The patient reports undergoing neck and head CT angiograms in [place], [place], due to injury/shooting pain. He was advised to follow up with someone for \"nodules on his thyroid.\" He notes that he weighed 180 pounds 2 years ago and weighs 124 lb. Today.   The patient has gained a few pounds.  He notes stress over the past 2 years, including a divorce; " "however, he reports eating and not re-gaining weight.     History of esophagitis  He has dysphagia; he has had visits esophageal balloon dilation possibly in 2022 with Dr. Mobley at [facility].  I have requested the records.  He used to be prescribed pantoprazole which was effective.  He is unsure when his last colonoscopy was done.  It was completed by Dr. Nicole in [place].  Records have been requested.      Bladder wall thickening, decreased urine stream  The patient's abdominal/pelvic CT scan on 10/03/2023 demonstrated bladder wall thickening.  Patient is being followed by Dr. Purcell. He was restarted on doxazosin and cialis. Also work up including cystoscopy.      He took buspirone regularly in the past and currently feels well without this. He is interested in therapy. No suicidal thoughts.     Social history  The patient is officially disabled/retired and works in construction. He recently restarted smoking and would like to try chantix; tolerated it in the past.    C/o runny nose, congestion, cough with shortness of breath associated with coughing spasms. Negative home covid test. No sick contacts.  Alleviating factors are Benadryl.    Objective   Vital Signs:  /72 (BP Location: Right arm, Patient Position: Sitting, Cuff Size: Adult)   Pulse 76   Temp 96.6 °F (35.9 °C) (Infrared)   Resp 20   Ht 164.5 cm (64.75\")   Wt 56.5 kg (124 lb 9.6 oz)   BMI 20.89 kg/m²     Physical Exam   Finger Rub Hearing{Test (right ear):passed  Finger Rub Hearing{Test (left ear):passed    The following data was reviewed by: HARI Jones on 12/20/2023:  CMP          8/3/2023    00:58   CMP   Glucose 92       BUN 16       Creatinine 0.9       Sodium 137       Potassium 3.7       Chloride 104       Calcium 9.2       Total Protein 6.6       Albumin 4.2       Total Bilirubin 0.4       Alkaline Phosphatase 60       AST (SGOT) 20       ALT (SGPT) 13       BUN/Creatinine Ratio 18       Anion Gap 6          " Details          This result is from an external source.                   TSH          11/13/2023    12:06   TSH   TSH 0.607      Lab Results   Component Value Date    PSA 1.240 11/13/2023    PSA 1.1 05/23/2022    PSA 1.0 06/23/2020                  Assessment and Plan   Diagnoses and all orders for this visit:    1. Medicare annual wellness visit, subsequent (Primary)    2. Cervical disc disease  -     Ambulatory Referral to Pain Management  -     meloxicam (Mobic) 7.5 MG tablet; Take 1 tablet by mouth Daily.  Dispense: 30 tablet; Refill: 0    3. Thyroid nodule  -US of thyroid today    4. Acute pain of right shoulder  -trial of meloxicam  -f/u with ortho    5. Esophagitis  -     pantoprazole (Protonix) 40 MG EC tablet; Take 1 tablet by mouth 2 (Two) Times a Day.  Dispense: 180 tablet; Refill: 0  -records requested; repeat egd if worsening    6. Upper respiratory tract infection, unspecified type  -     azithromycin (Zithromax Z-Luc) 250 MG tablet; Take 2 tablets by mouth on day 1, then 1 tablet daily on days 2-5  Dispense: 6 tablet; Refill: 0  -     methylPREDNISolone (MEDROL) 4 MG dose pack; Take as directed on package instructions.  Dispense: 21 tablet; Refill: 0    7. Tobacco abuse  -     varenicline (CHANTIX) 0.5 MG tablet; 0.5 MG X 11 & 1 MG X 42 tablet: Take 0.5 mg one daily on days 1-3 and and 0.5 mg twice daily on days 4-7.  Dispense: 11 tablet; Refill: 0  -     varenicline (CHANTIX) 1 MG tablet; Take 1 tablet by mouth 2 (Two) Times a Day.  Dispense: 180 tablet; Refill: 0    8. Anxiety  -     Ambulatory Referral to Behavioral Health    Scot LILIA Maldonado  reports that he has been smoking cigarettes. He has a 20.00 pack-year smoking history. He has never used smokeless tobacco.. I have educated him on the risk of diseases from using tobacco products such as cancer, COPD, and heart disease.     I advised him to quit and he is willing to quit. We have discussed the following method/s for tobacco cessation:   Prescription Medicaiton.  Together we have set a quit date for 1 week from today.  He will follow up with me in 3 months or sooner to check on his progress.    I spent 5 minutes counseling the patient.        I spent 35 minutes caring for Scot on this date of service. This time includes time spent by me in the following activities:preparing for the visit, reviewing tests, obtaining and/or reviewing a separately obtained history, performing a medically appropriate examination and/or evaluation , counseling and educating the patient/family/caregiver, and documenting information in the medical record       Follow Up   Return in about 3 months (around 3/20/2024) for Recheck.  Patient was given instructions and counseling regarding his condition or for health maintenance advice. Please see specific information pulled into the AVS if appropriate.

## 2023-12-20 NOTE — TELEPHONE ENCOUNTER
Can you find his last egd report possibly at Kirby's daughter and colonoscopy (done in [place])?

## 2023-12-26 ENCOUNTER — TELEPHONE (OUTPATIENT)
Dept: INTERNAL MEDICINE | Facility: CLINIC | Age: 58
End: 2023-12-26
Payer: MEDICARE

## 2023-12-26 NOTE — TELEPHONE ENCOUNTER
Patient finished steroids but still not cleared up. He thinks he needs another round of the steroids to knock it out. He's asking if you will call in Rx to [address].

## 2023-12-26 NOTE — TELEPHONE ENCOUNTER
RELAYED MESSAGE TO PATIENT.    HE HAS TRIED ALBUTEROL AND IT DOESN'T HELP MUCH. HE WOULD LIKE TO GET A STEROID AS WELL. HE WOULD ALSO LIKE TO GO TO PULMONARY. HE WOULD LIKE TO KNOW IF HE COULD GO TO PULMONARY BY THE END OF THE WEEK BEFORE HIS INSURANCE CHANGES.

## 2023-12-26 NOTE — TELEPHONE ENCOUNTER
I have called, unable to leave voicemail    Relay:  CT of chest shows mild emphysema.  I would like to try him on albuterol 1 to 2 puffs every 4-6 hours as needed for wheezing, cough or shortness of breath.  If he is already utilizing albuterol we can try another inhaler with a long-acting bronchodilator.  I would like to refer him to pulmonary.

## 2023-12-26 NOTE — TELEPHONE ENCOUNTER
CT of chest shows mild emphysema.  I would like to try him on albuterol 1 to 2 puffs every 4-6 hours as needed for wheezing, cough or shortness of breath.  If he is already utilizing albuterol we can try another inhaler with a long-acting bronchodilator.  I would like to refer him to pulmonary.

## 2023-12-27 ENCOUNTER — TELEPHONE (OUTPATIENT)
Dept: INTERNAL MEDICINE | Facility: CLINIC | Age: 58
End: 2023-12-27
Payer: MEDICARE

## 2023-12-27 ENCOUNTER — APPOINTMENT (OUTPATIENT)
Dept: GENERAL RADIOLOGY | Facility: HOSPITAL | Age: 58
End: 2023-12-27
Payer: MEDICARE

## 2023-12-27 ENCOUNTER — HOSPITAL ENCOUNTER (EMERGENCY)
Facility: HOSPITAL | Age: 58
Discharge: HOME | End: 2023-12-27
Attending: STUDENT IN AN ORGANIZED HEALTH CARE EDUCATION/TRAINING PROGRAM | Admitting: STUDENT IN AN ORGANIZED HEALTH CARE EDUCATION/TRAINING PROGRAM
Payer: MEDICARE

## 2023-12-27 VITALS
DIASTOLIC BLOOD PRESSURE: 64 MMHG | SYSTOLIC BLOOD PRESSURE: 102 MMHG | OXYGEN SATURATION: 98 % | BODY MASS INDEX: 19.99 KG/M2 | HEIGHT: 65 IN | TEMPERATURE: 97.7 F | HEART RATE: 60 BPM | WEIGHT: 120 LBS | RESPIRATION RATE: 16 BRPM

## 2023-12-27 DIAGNOSIS — J98.8 CONGESTION OF UPPER AIRWAY: Primary | ICD-10-CM

## 2023-12-27 DIAGNOSIS — R05.9 COUGH, UNSPECIFIED TYPE: ICD-10-CM

## 2023-12-27 DIAGNOSIS — B34.9 VIRAL SYNDROME: ICD-10-CM

## 2023-12-27 LAB
ALBUMIN SERPL-MCNC: 4 G/DL (ref 3.5–5.2)
ALBUMIN/GLOB SERPL: 1.5 G/DL
ALP SERPL-CCNC: 81 U/L (ref 39–117)
ALT SERPL W P-5'-P-CCNC: 19 U/L (ref 1–41)
ANION GAP SERPL CALCULATED.3IONS-SCNC: 10 MMOL/L (ref 5–15)
AST SERPL-CCNC: 18 U/L (ref 1–40)
BASOPHILS # BLD AUTO: 0.07 10*3/MM3 (ref 0–0.2)
BASOPHILS NFR BLD AUTO: 0.8 % (ref 0–1.5)
BILIRUB SERPL-MCNC: 0.2 MG/DL (ref 0–1.2)
BUN SERPL-MCNC: 16 MG/DL (ref 6–20)
BUN/CREAT SERPL: 23.9 (ref 7–25)
CALCIUM SPEC-SCNC: 9.1 MG/DL (ref 8.6–10.5)
CHLORIDE SERPL-SCNC: 102 MMOL/L (ref 98–107)
CO2 SERPL-SCNC: 26 MMOL/L (ref 22–29)
CREAT SERPL-MCNC: 0.67 MG/DL (ref 0.76–1.27)
DEPRECATED RDW RBC AUTO: 44.2 FL (ref 37–54)
EGFRCR SERPLBLD CKD-EPI 2021: 107.6 ML/MIN/1.73
EOSINOPHIL # BLD AUTO: 0.15 10*3/MM3 (ref 0–0.4)
EOSINOPHIL NFR BLD AUTO: 1.7 % (ref 0.3–6.2)
ERYTHROCYTE [DISTWIDTH] IN BLOOD BY AUTOMATED COUNT: 12.5 % (ref 12.3–15.4)
FLUAV SUBTYP SPEC NAA+PROBE: NOT DETECTED
FLUBV RNA ISLT QL NAA+PROBE: NOT DETECTED
GLOBULIN UR ELPH-MCNC: 2.6 GM/DL
GLUCOSE SERPL-MCNC: 146 MG/DL (ref 65–99)
HCT VFR BLD AUTO: 44.5 % (ref 37.5–51)
HGB BLD-MCNC: 15.3 G/DL (ref 13–17.7)
HOLD SPECIMEN: NORMAL
IMM GRANULOCYTES # BLD AUTO: 0.08 10*3/MM3 (ref 0–0.05)
IMM GRANULOCYTES NFR BLD AUTO: 0.9 % (ref 0–0.5)
LYMPHOCYTES # BLD AUTO: 2.36 10*3/MM3 (ref 0.7–3.1)
LYMPHOCYTES NFR BLD AUTO: 26.5 % (ref 19.6–45.3)
MCH RBC QN AUTO: 33.3 PG (ref 26.6–33)
MCHC RBC AUTO-ENTMCNC: 34.4 G/DL (ref 31.5–35.7)
MCV RBC AUTO: 96.9 FL (ref 79–97)
MONOCYTES # BLD AUTO: 0.65 10*3/MM3 (ref 0.1–0.9)
MONOCYTES NFR BLD AUTO: 7.3 % (ref 5–12)
NEUTROPHILS NFR BLD AUTO: 5.61 10*3/MM3 (ref 1.7–7)
NEUTROPHILS NFR BLD AUTO: 62.8 % (ref 42.7–76)
NRBC BLD AUTO-RTO: 0 /100 WBC (ref 0–0.2)
NT-PROBNP SERPL-MCNC: 69 PG/ML (ref 0–900)
PLATELET # BLD AUTO: 397 10*3/MM3 (ref 140–450)
PMV BLD AUTO: 8.5 FL (ref 6–12)
POTASSIUM SERPL-SCNC: 3.8 MMOL/L (ref 3.5–5.2)
PROT SERPL-MCNC: 6.6 G/DL (ref 6–8.5)
QT INTERVAL: 442 MS
QTC INTERVAL: 407 MS
RBC # BLD AUTO: 4.59 10*6/MM3 (ref 4.14–5.8)
SARS-COV-2 RNA RESP QL NAA+PROBE: NOT DETECTED
SODIUM SERPL-SCNC: 138 MMOL/L (ref 136–145)
TROPONIN T SERPL HS-MCNC: <6 NG/L
WBC NRBC COR # BLD AUTO: 8.92 10*3/MM3 (ref 3.4–10.8)
WHOLE BLOOD HOLD COAG: NORMAL
WHOLE BLOOD HOLD SPECIMEN: NORMAL

## 2023-12-27 PROCEDURE — 84484 ASSAY OF TROPONIN QUANT: CPT | Performed by: STUDENT IN AN ORGANIZED HEALTH CARE EDUCATION/TRAINING PROGRAM

## 2023-12-27 PROCEDURE — 99284 EMERGENCY DEPT VISIT MOD MDM: CPT

## 2023-12-27 PROCEDURE — 87636 SARSCOV2 & INF A&B AMP PRB: CPT

## 2023-12-27 PROCEDURE — 80053 COMPREHEN METABOLIC PANEL: CPT | Performed by: STUDENT IN AN ORGANIZED HEALTH CARE EDUCATION/TRAINING PROGRAM

## 2023-12-27 PROCEDURE — 85025 COMPLETE CBC W/AUTO DIFF WBC: CPT | Performed by: STUDENT IN AN ORGANIZED HEALTH CARE EDUCATION/TRAINING PROGRAM

## 2023-12-27 PROCEDURE — 71045 X-RAY EXAM CHEST 1 VIEW: CPT

## 2023-12-27 PROCEDURE — 83880 ASSAY OF NATRIURETIC PEPTIDE: CPT | Performed by: STUDENT IN AN ORGANIZED HEALTH CARE EDUCATION/TRAINING PROGRAM

## 2023-12-27 PROCEDURE — 94640 AIRWAY INHALATION TREATMENT: CPT

## 2023-12-27 PROCEDURE — 93005 ELECTROCARDIOGRAM TRACING: CPT | Performed by: STUDENT IN AN ORGANIZED HEALTH CARE EDUCATION/TRAINING PROGRAM

## 2023-12-27 RX ORDER — SODIUM CHLORIDE 0.9 % (FLUSH) 0.9 %
10 SYRINGE (ML) INJECTION AS NEEDED
Status: DISCONTINUED | OUTPATIENT
Start: 2023-12-27 | End: 2023-12-27 | Stop reason: HOSPADM

## 2023-12-27 RX ORDER — IPRATROPIUM BROMIDE AND ALBUTEROL SULFATE 2.5; .5 MG/3ML; MG/3ML
3 SOLUTION RESPIRATORY (INHALATION) ONCE
Status: COMPLETED | OUTPATIENT
Start: 2023-12-27 | End: 2023-12-27

## 2023-12-27 RX ORDER — BUSPIRONE HYDROCHLORIDE 10 MG/1
10 TABLET ORAL 3 TIMES DAILY
Qty: 60 TABLET | Refills: 0 | Status: SHIPPED | OUTPATIENT
Start: 2023-12-27

## 2023-12-27 RX ORDER — FLUTICASONE PROPIONATE 50 MCG
2 SPRAY, SUSPENSION (ML) NASAL DAILY
Qty: 15.8 ML | Refills: 0 | Status: SHIPPED | OUTPATIENT
Start: 2023-12-27

## 2023-12-27 RX ORDER — ALBUTEROL SULFATE 90 UG/1
2 AEROSOL, METERED RESPIRATORY (INHALATION) EVERY 6 HOURS PRN
Qty: 18 G | Refills: 0 | Status: SHIPPED | OUTPATIENT
Start: 2023-12-27

## 2023-12-27 RX ORDER — METHYLPREDNISOLONE 4 MG/1
TABLET ORAL
Qty: 21 TABLET | Refills: 0 | Status: SHIPPED | OUTPATIENT
Start: 2023-12-27

## 2023-12-27 RX ADMIN — IPRATROPIUM BROMIDE AND ALBUTEROL SULFATE 3 ML: 2.5; .5 SOLUTION RESPIRATORY (INHALATION) at 10:21

## 2023-12-27 NOTE — ED PROVIDER NOTES
"Subjective   History of Present Illness is a 58-year-old gentleman who was sent by his primary care provider, for concerns around pneumonia.  Patient reports 2 weeks of cough, congestion, upper respiratory symptoms, occasional shortness of breath.  Patient reports he was told today he has emphysema, he is smoked heavily for years tobacco and marijuana.  Patient reports he has been stone \"sober for 8 years, lost approximately 60 to 70 pounds as he has embraced a more healthy lifestyle.  Reports no chest pain at that time although he reports previous instances of chest pressure he describes.    Review of Systems   Constitutional: Negative.    HENT:  Positive for congestion and sinus pressure.    Respiratory:  Positive for cough and shortness of breath.    Cardiovascular: Negative.    Gastrointestinal: Negative.    Genitourinary: Negative.    Musculoskeletal: Negative.    Skin: Negative.    Neurological: Negative.    Psychiatric/Behavioral: Negative.         Past Medical History:   Diagnosis Date    Acid reflux     ADHD     Double vision     PTSD (post-traumatic stress disorder)     Urinary retention        Allergies   Allergen Reactions    Dextromethorphan Hives       Past Surgical History:   Procedure Laterality Date    ESOPHAGUS SURGERY      EYE SURGERY      WRIST FUSION         Family History   Problem Relation Age of Onset    Diabetes Father        Social History     Socioeconomic History    Marital status:    Tobacco Use    Smoking status: Every Day     Packs/day: 0.50     Years: 40.00     Additional pack years: 0.00     Total pack years: 20.00     Types: Cigarettes    Smokeless tobacco: Never   Vaping Use    Vaping Use: Never used   Substance and Sexual Activity    Alcohol use: Never    Drug use: Yes     Types: Marijuana, Amphetamines    Sexual activity: Defer           Objective   Physical Exam  Constitutional:       General: He is not in acute distress.     Appearance: Normal appearance. He is " ill-appearing.   HENT:      Head: Normocephalic.      Right Ear: External ear normal.      Left Ear: External ear normal.      Nose: Nose normal.   Eyes:      Extraocular Movements: Extraocular movements intact.      Conjunctiva/sclera: Conjunctivae normal.      Pupils: Pupils are equal, round, and reactive to light.   Cardiovascular:      Rate and Rhythm: Normal rate.      Pulses: Normal pulses.   Pulmonary:      Effort: Pulmonary effort is normal. No respiratory distress.      Breath sounds: Rhonchi present.   Abdominal:      General: Abdomen is flat. Bowel sounds are normal.      Palpations: Abdomen is soft.      Tenderness: There is no abdominal tenderness. There is no right CVA tenderness, left CVA tenderness or guarding.   Musculoskeletal:         General: Normal range of motion.      Cervical back: Normal range of motion.   Skin:     General: Skin is warm and dry.      Capillary Refill: Capillary refill takes less than 2 seconds.   Neurological:      General: No focal deficit present.      Mental Status: He is alert and oriented to person, place, and time.   Psychiatric:         Mood and Affect: Mood normal.         Behavior: Behavior normal.         Procedures           ED Course  ED Course as of 12/27/23 1211   Wed Dec 27, 2023   1004 Initial evaluation the patient in room 11 []   1209 Evaluated the patient, reports some relief of his congestion with the DuoNeb, and no other complaints, and absence of acute abnormalities, patient be discharged home with outpatient prescriptions provided.  Patient agreeable to plan as explained. []      ED Course User Index  [] Karl Solorzano, HARI                                             Medical Decision Making  The patient's report of upper respiratory symptoms, possible sick contacts, history of pulmonary disease, differential diagnosis includes upper respiratory infection including viral causes of COVID, flu among others, pneumonia, pleural effusion, COPD  exacerbation, acute coronary syndrome, less likely congestive heart failure, electrolyte derangement.  Patient will have serum screening labs, twelve-lead EKG, plain view of the chest, albuterol Atrovent nebulized treatment.  Results will be communicated disposition considered.  Will also have rapid viral panel obtained and sent.    Amount and/or Complexity of Data Reviewed  Labs: ordered.  Radiology: ordered.  ECG/medicine tests: ordered.    Risk  Prescription drug management.        Final diagnoses:   Congestion of upper airway   Cough, unspecified type   Viral syndrome       ED Disposition  ED Disposition       ED Disposition   Discharge    Condition   Stable    Comment   --               Anna Gamez APRN  [address]  [address]  [place] [id number]  [phone]      As needed         Medication List        New Prescriptions      albuterol sulfate  (90 Base) MCG/ACT inhaler  Commonly known as: PROVENTIL HFA;VENTOLIN HFA;PROAIR HFA  Inhale 2 puffs Every 6 (Six) Hours As Needed for Wheezing.     fluticasone 50 MCG/ACT nasal spray  Commonly known as: FLONASE  2 sprays into the nostril(s) as directed by provider Daily.               Where to Get Your Medications        These medications were sent to [facility] [id number] - [place], [place] - [address] - [phone]  - [phone]   [address] [address]      Phone: [phone]   albuterol sulfate  (90 Base) MCG/ACT inhaler  fluticasone 50 MCG/ACT nasal spray            Karl Solorzano APRN  12/27/23 0294

## 2023-12-27 NOTE — TELEPHONE ENCOUNTER
1.The patient stopped by the office this afternoon. He states he went to ER this morning and they tested him for covid/ flu and everything was negative. They did a breathing treatment and prescribed him an albuterol inhaler and flonase. They stated they could not prescribe him oral steroids that he needed to call and ask his PCP. So he is asking if we can call him in a medrol dose pack?    2.Another problem that came up was they did a scan on him and found scaring on his heart indicating previous mini heart attacks which he was not aware of.   He has a history of anxiety and feels his anxiety has been bad lately. After finding out this news its getting worse. He states he used to be on Buspirone 12mg two times a day. He is wanting to start back on it again. Can we call in a rx for that also?    Please advise

## 2023-12-27 NOTE — TELEPHONE ENCOUNTER
Will Medrol Dosepak and buspirone sent to the patient's pharmacy.  He should follow-up with Anna in clinic in 1 month.  Please make appointment.

## 2023-12-27 NOTE — TELEPHONE ENCOUNTER
Please call the pharmacy to see if the patient's Breztri is covered.  Please let me know if the medication is not covered by the patient's insurance.

## 2023-12-27 NOTE — TELEPHONE ENCOUNTER
I spoke with patient. He states his shortness of breath is worse this morning. He states the other inhaler (long acting bronchodilator) was not sent in. He would like that inhaler sent into the pharmacy.   I told him we would send in the inhaler but he needs to be seen in the ER today to be evaluated. He said his mom has covid but he tested negative at home. I told him he needs to go to ER. Patient verbalized understanding and will go to ER  today.

## 2023-12-28 NOTE — TELEPHONE ENCOUNTER
Called patient to scheduled appointment. Patient's current insurance is no longer accepted by [employer]. He is working on getting it switched. He will call us to schedule an appointment once he knows when his new insurance is effective.

## 2024-01-23 DIAGNOSIS — R39.9 LOWER URINARY TRACT SYMPTOMS (LUTS): ICD-10-CM

## 2024-01-23 RX ORDER — DOXAZOSIN MESYLATE 4 MG/1
8 TABLET ORAL NIGHTLY
Qty: 90 TABLET | Refills: 1 | Status: SHIPPED | OUTPATIENT
Start: 2024-01-23

## 2024-01-23 NOTE — TELEPHONE ENCOUNTER
Rx Refill Note  Requested Prescriptions     Pending Prescriptions Disp Refills    doxazosin (CARDURA) 4 MG tablet [Pharmacy Med Name: DOXAZOSIN MESYLATE 4 MG TAB] 90 tablet 1     Sig: TAKE TWO TABLETS BY MOUTH ONCE NIGHTLY      Last office visit with prescribing clinician: 12/06/2023  Next office visit with prescribing clinician:       Jessica Rowe MA  01/23/24, 15:27 EST

## 2024-03-06 ENCOUNTER — OFFICE VISIT (OUTPATIENT)
Dept: INTERNAL MEDICINE | Facility: CLINIC | Age: 59
End: 2024-03-06
Payer: MEDICARE

## 2024-03-06 VITALS
RESPIRATION RATE: 16 BRPM | SYSTOLIC BLOOD PRESSURE: 102 MMHG | BODY MASS INDEX: 19.97 KG/M2 | WEIGHT: 120 LBS | DIASTOLIC BLOOD PRESSURE: 60 MMHG | HEART RATE: 80 BPM | TEMPERATURE: 97.5 F

## 2024-03-06 DIAGNOSIS — F41.1 GENERALIZED ANXIETY DISORDER: ICD-10-CM

## 2024-03-06 DIAGNOSIS — R07.9 CHEST PAIN, UNSPECIFIED TYPE: Primary | ICD-10-CM

## 2024-03-06 DIAGNOSIS — N52.9 ERECTILE DYSFUNCTION, UNSPECIFIED ERECTILE DYSFUNCTION TYPE: ICD-10-CM

## 2024-03-06 DIAGNOSIS — F43.10 PTSD (POST-TRAUMATIC STRESS DISORDER): ICD-10-CM

## 2024-03-06 DIAGNOSIS — Z98.890 HISTORY OF NISSEN FUNDOPLICATION: ICD-10-CM

## 2024-03-06 DIAGNOSIS — K21.9 GASTROESOPHAGEAL REFLUX DISEASE WITHOUT ESOPHAGITIS: ICD-10-CM

## 2024-03-06 DIAGNOSIS — R09.89 CHOKING EPISODE: ICD-10-CM

## 2024-03-06 DIAGNOSIS — K44.9 HIATAL HERNIA: ICD-10-CM

## 2024-03-06 LAB
BASOPHILS # BLD AUTO: 0.04 10*3/MM3 (ref 0–0.2)
BASOPHILS NFR BLD AUTO: 0.6 % (ref 0–1.5)
DEPRECATED RDW RBC AUTO: 43.3 FL (ref 37–54)
EOSINOPHIL # BLD AUTO: 0.11 10*3/MM3 (ref 0–0.4)
EOSINOPHIL NFR BLD AUTO: 1.7 % (ref 0.3–6.2)
ERYTHROCYTE [DISTWIDTH] IN BLOOD BY AUTOMATED COUNT: 12.2 % (ref 12.3–15.4)
HCT VFR BLD AUTO: 41.3 % (ref 37.5–51)
HGB BLD-MCNC: 14.1 G/DL (ref 13–17.7)
IMM GRANULOCYTES # BLD AUTO: 0.02 10*3/MM3 (ref 0–0.05)
IMM GRANULOCYTES NFR BLD AUTO: 0.3 % (ref 0–0.5)
LYMPHOCYTES # BLD AUTO: 2.28 10*3/MM3 (ref 0.7–3.1)
LYMPHOCYTES NFR BLD AUTO: 34.7 % (ref 19.6–45.3)
MCH RBC QN AUTO: 32.9 PG (ref 26.6–33)
MCHC RBC AUTO-ENTMCNC: 34.1 G/DL (ref 31.5–35.7)
MCV RBC AUTO: 96.3 FL (ref 79–97)
MONOCYTES # BLD AUTO: 0.43 10*3/MM3 (ref 0.1–0.9)
MONOCYTES NFR BLD AUTO: 6.5 % (ref 5–12)
NEUTROPHILS NFR BLD AUTO: 3.7 10*3/MM3 (ref 1.7–7)
NEUTROPHILS NFR BLD AUTO: 56.2 % (ref 42.7–76)
NRBC BLD AUTO-RTO: 0 /100 WBC (ref 0–0.2)
PLATELET # BLD AUTO: 301 10*3/MM3 (ref 140–450)
PMV BLD AUTO: 8.9 FL (ref 6–12)
RBC # BLD AUTO: 4.29 10*6/MM3 (ref 4.14–5.8)
WBC NRBC COR # BLD AUTO: 6.58 10*3/MM3 (ref 3.4–10.8)

## 2024-03-06 PROCEDURE — 80061 LIPID PANEL: CPT | Performed by: PHYSICIAN ASSISTANT

## 2024-03-06 PROCEDURE — 85025 COMPLETE CBC W/AUTO DIFF WBC: CPT | Performed by: PHYSICIAN ASSISTANT

## 2024-03-06 PROCEDURE — 80053 COMPREHEN METABOLIC PANEL: CPT | Performed by: PHYSICIAN ASSISTANT

## 2024-03-06 PROCEDURE — 84443 ASSAY THYROID STIM HORMONE: CPT | Performed by: PHYSICIAN ASSISTANT

## 2024-03-06 RX ORDER — BUSPIRONE HYDROCHLORIDE 10 MG/1
10 TABLET ORAL 3 TIMES DAILY
Qty: 90 TABLET | Refills: 1 | Status: SHIPPED | OUTPATIENT
Start: 2024-03-06

## 2024-03-06 RX ORDER — NITROGLYCERIN 0.4 MG/1
0.4 TABLET SUBLINGUAL
Qty: 10 TABLET | Refills: 12 | Status: SHIPPED | OUTPATIENT
Start: 2024-03-06

## 2024-03-06 RX ORDER — TADALAFIL 5 MG/1
5 TABLET ORAL DAILY PRN
Qty: 30 TABLET | Refills: 2 | Status: SHIPPED | OUTPATIENT
Start: 2024-03-06
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
## 2024-03-07 ENCOUNTER — TELEPHONE (OUTPATIENT)
Dept: CARDIOLOGY | Facility: CLINIC | Age: 59
End: 2024-03-07
Payer: MEDICARE

## 2024-03-07 ENCOUNTER — HOSPITAL ENCOUNTER (OUTPATIENT)
Dept: CARDIOLOGY | Facility: HOSPITAL | Age: 59
Discharge: HOME | End: 2024-03-07
Payer: MEDICARE

## 2024-03-07 ENCOUNTER — OFFICE VISIT (OUTPATIENT)
Dept: CARDIOLOGY | Facility: HOSPITAL | Age: 59
End: 2024-03-07
Payer: MEDICARE

## 2024-03-07 VITALS
RESPIRATION RATE: 18 BRPM | HEIGHT: 65 IN | OXYGEN SATURATION: 99 % | BODY MASS INDEX: 20.59 KG/M2 | DIASTOLIC BLOOD PRESSURE: 59 MMHG | HEART RATE: 79 BPM | SYSTOLIC BLOOD PRESSURE: 106 MMHG | TEMPERATURE: 97.4 F | WEIGHT: 123.6 LBS

## 2024-03-07 DIAGNOSIS — R00.2 PALPITATIONS: ICD-10-CM

## 2024-03-07 DIAGNOSIS — R07.89 OTHER CHEST PAIN: Primary | ICD-10-CM

## 2024-03-07 DIAGNOSIS — R07.89 OTHER CHEST PAIN: ICD-10-CM

## 2024-03-07 LAB
ALBUMIN SERPL-MCNC: 4.3 G/DL (ref 3.5–5.2)
ALBUMIN/GLOB SERPL: 2.2 G/DL
ALP SERPL-CCNC: 85 U/L (ref 39–117)
ALT SERPL W P-5'-P-CCNC: 18 U/L (ref 1–41)
ANION GAP SERPL CALCULATED.3IONS-SCNC: 10 MMOL/L (ref 5–15)
AST SERPL-CCNC: 19 U/L (ref 1–40)
BILIRUB SERPL-MCNC: 0.5 MG/DL (ref 0–1.2)
BUN SERPL-MCNC: 18 MG/DL (ref 6–20)
BUN/CREAT SERPL: 20.7 (ref 7–25)
CALCIUM SPEC-SCNC: 9.1 MG/DL (ref 8.6–10.5)
CHLORIDE SERPL-SCNC: 104 MMOL/L (ref 98–107)
CHOLEST SERPL-MCNC: 171 MG/DL (ref 0–200)
CO2 SERPL-SCNC: 26 MMOL/L (ref 22–29)
CREAT SERPL-MCNC: 0.87 MG/DL (ref 0.76–1.27)
EGFRCR SERPLBLD CKD-EPI 2021: 99.4 ML/MIN/1.73
GLOBULIN UR ELPH-MCNC: 2 GM/DL
GLUCOSE SERPL-MCNC: 104 MG/DL (ref 65–99)
HDLC SERPL-MCNC: 71 MG/DL (ref 40–60)
LDLC SERPL CALC-MCNC: 91 MG/DL (ref 0–100)
LDLC/HDLC SERPL: 1.28 {RATIO}
POTASSIUM SERPL-SCNC: 3.9 MMOL/L (ref 3.5–5.2)
PROT SERPL-MCNC: 6.3 G/DL (ref 6–8.5)
SODIUM SERPL-SCNC: 140 MMOL/L (ref 136–145)
TRIGL SERPL-MCNC: 45 MG/DL (ref 0–150)
TSH SERPL DL<=0.05 MIU/L-ACNC: 0.68 UIU/ML (ref 0.27–4.2)
VLDLC SERPL-MCNC: 9 MG/DL (ref 5–40)

## 2024-03-07 PROCEDURE — 93242 EXT ECG>48HR<7D RECORDING: CPT

## 2024-03-07 NOTE — TELEPHONE ENCOUNTER
Called spoke directly with patient about Holter Monitor Ordered by Amber Gould ordered yesterday at the visit.  Patient stated that he went to [facility] [place] today and a monitor was placed by [facility] NORIEGA    No further action needed at [facility] due to heart monitor being placed By [facility].

## 2024-03-07 NOTE — PROGRESS NOTES
"Chief Complaint  Chest Pain    Subjective      History of Present Illness {  Problem List  Visit  Diagnosis   Encounters  Notes  Medications  Labs  Result Review Imaging  Media :23}     Aashish Maldonado, 58 y.o. male with GERD, PTSD, chest pain presents to [facility] for Chest Pain.    Patient presents upon referral from PCP for evaluation of chest pain.  Myocardial perfusion study and Holter monitor recently ordered by PCP.  ECG with right bundle branch block, no acute ischemia. Recent LDL well controlled with PCP.     Presents today noting an episode of esophageal tightening that prompted anxious and chest tightness feeling. Occasional right sided chest pain as well that radiates to back, but also some orthopedic work on the right shoulder. Pain worsens with certain movements with no exertional chest pain. Intermittent palpitations that he relates with anxiety; describes heartbeat as a skipped/fluttering heartbeat. Caffeine seems to help symptoms. Lightheadedness with position changes, but no symptoms. Current smoker of 40+ years.       Objective     Vital Signs:   Vitals:    03/07/24 0832 03/07/24 0833 03/07/24 0834   BP: 106/56 104/66 106/59   BP Location: Right arm Left arm Left arm   Patient Position: Sitting Standing Sitting   Cuff Size: Adult Adult Adult   Pulse: 81 80 79   Resp:   18   Temp: 97.4 °F (36.3 °C) 97.4 °F (36.3 °C) 97.4 °F (36.3 °C)   TempSrc: Temporal Temporal Temporal   SpO2: 98% 96% 99%   Weight:   56.1 kg (123 lb 9.6 oz)   Height:   165.1 cm (65\")     Body mass index is 20.57 kg/m².  Physical Exam  Vitals and nursing note reviewed.   Constitutional:       Appearance: Normal appearance.   HENT:      Head: Normocephalic.   Eyes:      Extraocular Movements: Extraocular movements intact.   Neck:      Vascular: No carotid bruit.   Cardiovascular:      Rate and Rhythm: Normal rate and regular rhythm.      Pulses: Normal pulses.      Heart sounds: Normal heart " sounds, S1 normal and S2 normal. No murmur heard.  Pulmonary:      Effort: Pulmonary effort is normal. No respiratory distress.      Breath sounds: Normal breath sounds.   Musculoskeletal:      Cervical back: Neck supple.      Right lower leg: No edema.      Left lower leg: No edema.   Skin:     General: Skin is warm and dry.   Neurological:      General: No focal deficit present.      Mental Status: He is alert.   Psychiatric:         Mood and Affect: Mood normal.         Behavior: Behavior normal.         Thought Content: Thought content normal.        Data Reviewed:{ Labs  Result Review  Imaging  Med Tab  Media :23}     TSH (03/06/2024 15:12)  Lipid Panel (03/06/2024 15:12)  CBC & Differential (03/06/2024 15:12)  Comprehensive Metabolic Panel (03/06/2024 15:12)  BNP (12/27/2023 10:04)  ECG 12 Lead (03/06/2024 15:04)       Assessment & Plan   Assessment and Plan {CC Problem List  Visit Diagnosis  ROS  Review (Popup)  Health Maintenance  Quality  BestPractice  Medications  SmartSets  SnapShot Encounters  Media :23}     1. Chest pain  -New onset intermittent symptoms. Mixed symptoms. Given new onset symptoms recommend continuing with myocardial perfusion study as scheduled  -Possibly musculoskeletal given symptom description  -Denies chest pain currently   -f/u in 1 month for symptom check    2. Palpitations  -Intermittent palpitation, Denies near syncope/syncope  - Holter Monitor - 14 Days; Future      Follow Up {Instructions Charge Capture  Follow-up Communications :23}     Return in about 1 month (around 4/7/2024) for Office follow-up, Monitor results, Recheck.    Patient was given instructions and counseling regarding his condition or for health maintenance advice. Please see specific information pulled into the AVS if appropriate. Patient was instructed to call the [facility] with any questions, concerns, or worsening symptoms.    Dictated Utilizing Dragon Dictation   Please note  that portions of this note were completed with a voice recognition program.   Part of this note may be an electronic transcription/translation of spoken language to printed text using the Dragon Dictation System.

## 2024-03-07 NOTE — PROGRESS NOTES
[facility] Heart Monitor Documentation    Aashish Maldonado  1965  [id number]  03/07/24      [] ZIO XT Patch  Model [id number] Prescribed for  Days    Serial Number: (N + 9 Digits) N   Apply-By Date on Box:   USPS Tracking Number:   USPS Tracking        [] [employer] MINI PLUS Mobile Cardiac Telemetry  Model BGMINIPLUS Prescribed for  Days    Serial Number: (BGM + 7 Digits) BGM  Shipped-By Date on Box:   UPS Tracking Number: [id number]  UPS Tracking      [] [employer] MINI Holter Monitor  Model BGMINIEL Prescribed for 14 Days    Serial Number: (7 Digits) [id number]  Shipped-By Date on Box: 02/22/2024  UPS Tracking Number: [id number]  UPS Tracking        This monitor was applied to the patient's chest and checked for proper functioning.  Mr. Aashish Maldonado was instructed in the proper use of this monitor.  He was given the opportunity to ask questions and left the office with the device 's instruction manual.    Any Vale, [facility], 09:13 EST, 03/07/24                  [facility] 8.8.[facility]

## 2024-03-15 DIAGNOSIS — K20.90 ESOPHAGITIS: ICD-10-CM

## 2024-03-15 NOTE — TELEPHONE ENCOUNTER
LOV 03/06/2024  NOV     Tried to reach patient no answer left voicemail to return call    RELAY:  We recently received your message to refill your medication(s).  Our records indicate that you did not schedule a follow up appointment with your provider at your last visit on 03/06/2024. The medication that you are on requires a follow up appointment for additional refills. Patient was to schedule on or around 12/20/2024 for Medicare wellness  If he is unable to keep his appointment we will not be able to provider further refills.  Once appointment has been scheduled please update message with date and time so we can process the request.  We will forward the message to the provider to review the refill request.

## 2024-03-16 ENCOUNTER — HOSPITAL ENCOUNTER (EMERGENCY)
Facility: HOSPITAL | Age: 59
Discharge: HOME | End: 2024-03-16
Attending: EMERGENCY MEDICINE
Payer: MEDICARE

## 2024-03-16 ENCOUNTER — APPOINTMENT (OUTPATIENT)
Dept: GENERAL RADIOLOGY | Facility: HOSPITAL | Age: 59
End: 2024-03-16
Payer: MEDICARE

## 2024-03-16 VITALS
DIASTOLIC BLOOD PRESSURE: 70 MMHG | SYSTOLIC BLOOD PRESSURE: 110 MMHG | TEMPERATURE: 97.5 F | RESPIRATION RATE: 20 BRPM | WEIGHT: 122 LBS | BODY MASS INDEX: 19.61 KG/M2 | HEART RATE: 68 BPM | HEIGHT: 66 IN | OXYGEN SATURATION: 97 %

## 2024-03-16 DIAGNOSIS — R07.9 NONSPECIFIC CHEST PAIN: ICD-10-CM

## 2024-03-16 DIAGNOSIS — S61.221A LACERATION OF LEFT INDEX FINGER WITH FOREIGN BODY WITHOUT DAMAGE TO NAIL, INITIAL ENCOUNTER: Primary | ICD-10-CM

## 2024-03-16 LAB
ALBUMIN SERPL-MCNC: 4.2 G/DL (ref 3.5–5.2)
ALBUMIN/GLOB SERPL: 1.9 G/DL
ALP SERPL-CCNC: 72 U/L (ref 39–117)
ALT SERPL W P-5'-P-CCNC: 15 U/L (ref 1–41)
ANION GAP SERPL CALCULATED.3IONS-SCNC: 8 MMOL/L (ref 5–15)
AST SERPL-CCNC: 25 U/L (ref 1–40)
BASOPHILS # BLD AUTO: 0.06 10*3/MM3 (ref 0–0.2)
BASOPHILS NFR BLD AUTO: 0.6 % (ref 0–1.5)
BILIRUB SERPL-MCNC: 0.8 MG/DL (ref 0–1.2)
BUN SERPL-MCNC: 9 MG/DL (ref 6–20)
BUN/CREAT SERPL: 12.3 (ref 7–25)
CALCIUM SPEC-SCNC: 9.1 MG/DL (ref 8.6–10.5)
CHLORIDE SERPL-SCNC: 101 MMOL/L (ref 98–107)
CO2 SERPL-SCNC: 28 MMOL/L (ref 22–29)
CREAT SERPL-MCNC: 0.73 MG/DL (ref 0.76–1.27)
DEPRECATED RDW RBC AUTO: 43.1 FL (ref 37–54)
EGFRCR SERPLBLD CKD-EPI 2021: 104.8 ML/MIN/1.73
EOSINOPHIL # BLD AUTO: 0.17 10*3/MM3 (ref 0–0.4)
EOSINOPHIL NFR BLD AUTO: 1.8 % (ref 0.3–6.2)
ERYTHROCYTE [DISTWIDTH] IN BLOOD BY AUTOMATED COUNT: 12.2 % (ref 12.3–15.4)
GEN 5 2HR TROPONIN T REFLEX: 6 NG/L
GLOBULIN UR ELPH-MCNC: 2.2 GM/DL
GLUCOSE SERPL-MCNC: 90 MG/DL (ref 65–99)
HCT VFR BLD AUTO: 38.2 % (ref 37.5–51)
HGB BLD-MCNC: 13.4 G/DL (ref 13–17.7)
HOLD SPECIMEN: NORMAL
IMM GRANULOCYTES # BLD AUTO: 0.02 10*3/MM3 (ref 0–0.05)
IMM GRANULOCYTES NFR BLD AUTO: 0.2 % (ref 0–0.5)
LIPASE SERPL-CCNC: 111 U/L (ref 13–60)
LYMPHOCYTES # BLD AUTO: 2.74 10*3/MM3 (ref 0.7–3.1)
LYMPHOCYTES NFR BLD AUTO: 29.2 % (ref 19.6–45.3)
MCH RBC QN AUTO: 33.5 PG (ref 26.6–33)
MCHC RBC AUTO-ENTMCNC: 35.1 G/DL (ref 31.5–35.7)
MCV RBC AUTO: 95.5 FL (ref 79–97)
MONOCYTES # BLD AUTO: 0.91 10*3/MM3 (ref 0.1–0.9)
MONOCYTES NFR BLD AUTO: 9.7 % (ref 5–12)
NEUTROPHILS NFR BLD AUTO: 5.47 10*3/MM3 (ref 1.7–7)
NEUTROPHILS NFR BLD AUTO: 58.5 % (ref 42.7–76)
NRBC BLD AUTO-RTO: 0 /100 WBC (ref 0–0.2)
NT-PROBNP SERPL-MCNC: 51.1 PG/ML (ref 0–900)
PLATELET # BLD AUTO: 300 10*3/MM3 (ref 140–450)
PMV BLD AUTO: 8.7 FL (ref 6–12)
POTASSIUM SERPL-SCNC: 3.4 MMOL/L (ref 3.5–5.2)
PROT SERPL-MCNC: 6.4 G/DL (ref 6–8.5)
RBC # BLD AUTO: 4 10*6/MM3 (ref 4.14–5.8)
SODIUM SERPL-SCNC: 137 MMOL/L (ref 136–145)
TROPONIN T DELTA: NORMAL
TROPONIN T SERPL HS-MCNC: <6 NG/L
WBC NRBC COR # BLD AUTO: 9.37 10*3/MM3 (ref 3.4–10.8)
WHOLE BLOOD HOLD COAG: NORMAL
WHOLE BLOOD HOLD SPECIMEN: NORMAL

## 2024-03-16 PROCEDURE — 93005 ELECTROCARDIOGRAM TRACING: CPT | Performed by: EMERGENCY MEDICINE

## 2024-03-16 PROCEDURE — 84484 ASSAY OF TROPONIN QUANT: CPT | Performed by: EMERGENCY MEDICINE

## 2024-03-16 PROCEDURE — 85025 COMPLETE CBC W/AUTO DIFF WBC: CPT | Performed by: EMERGENCY MEDICINE

## 2024-03-16 PROCEDURE — 83880 ASSAY OF NATRIURETIC PEPTIDE: CPT | Performed by: EMERGENCY MEDICINE

## 2024-03-16 PROCEDURE — 99284 EMERGENCY DEPT VISIT MOD MDM: CPT

## 2024-03-16 PROCEDURE — 93005 ELECTROCARDIOGRAM TRACING: CPT

## 2024-03-16 PROCEDURE — 36415 COLL VENOUS BLD VENIPUNCTURE: CPT

## 2024-03-16 PROCEDURE — 71045 X-RAY EXAM CHEST 1 VIEW: CPT

## 2024-03-16 PROCEDURE — 80053 COMPREHEN METABOLIC PANEL: CPT | Performed by: EMERGENCY MEDICINE

## 2024-03-16 PROCEDURE — 83690 ASSAY OF LIPASE: CPT | Performed by: EMERGENCY MEDICINE

## 2024-03-16 RX ORDER — SODIUM CHLORIDE 0.9 % (FLUSH) 0.9 %
10 SYRINGE (ML) INJECTION AS NEEDED
Status: DISCONTINUED | OUTPATIENT
Start: 2024-03-16 | End: 2024-03-16 | Stop reason: HOSPADM

## 2024-03-16 RX ORDER — ASPIRIN 81 MG/1
324 TABLET, CHEWABLE ORAL ONCE
Status: COMPLETED | OUTPATIENT
Start: 2024-03-16 | End: 2024-03-16

## 2024-03-16 RX ADMIN — ASPIRIN 324 MG: 81 TABLET, CHEWABLE ORAL at 01:18

## 2024-03-16 NOTE — ED PROVIDER NOTES
EMERGENCY DEPARTMENT ENCOUNTER    Pt Name: Aashish Maldonado  MRN: [id number]  Pt :   1965  Room Number:    Date of encounter:  3/16/2024  PCP: Anna Gamez APRN  ED Provider: RANDI Horan    Historian: Patient    HPI:  Chief Complaint: Chest pain    Context: Aashish Maldonado is a 58 y.o. male who presents to the ED c/o chest pain.  Patient reports that he was working this evening putting down mulch and putting his knife away when he accidentally cut the knuckle of his left index finger.  He states that he had difficulty getting the bleeding controlled and put a bandage on it.  He is up-to-date on tetanus.  He also reports that whenever he is anxious and has panic attacks he starts to experience chest pain.  He states tonight when he cut his finger he got very anxious and started to have pain in his chest.  He notes that he currently is wearing a Holter monitor and being evaluated by cardiology for pain in his chest and palpitations.  No additional complaints on exam.  HPI     REVIEW OF SYSTEMS  A chief complaint appropriate review of systems was completed and is negative except as noted in the HPI.     PAST MEDICAL HISTORY  Past Medical History:   Diagnosis Date    Acid reflux     ADHD     Double vision     PTSD (post-traumatic stress disorder)     Urinary retention        PAST SURGICAL HISTORY  Past Surgical History:   Procedure Laterality Date    ESOPHAGUS SURGERY      EYE SURGERY      SHOULDER SURGERY Right     WRIST FUSION         FAMILY HISTORY  Family History   Problem Relation Age of Onset    Hearing loss Mother     Diabetes Father     Kidney failure Father     Cancer Sister     No Known Problems Maternal Grandmother     Heart disease Maternal Grandfather     Diabetes Paternal Grandmother     Alzheimer's disease Paternal Grandmother     Diabetes Paternal Grandfather     Alzheimer's disease Paternal Grandfather        SOCIAL HISTORY  Social History     Socioeconomic History    Marital status:     Tobacco Use    Smoking status: Every Day     Current packs/day: 0.50     Average packs/day: 0.5 packs/day for 40.0 years (20.0 ttl pk-yrs)     Types: Cigarettes    Smokeless tobacco: Never   Vaping Use    Vaping status: Never Used   Substance and Sexual Activity    Alcohol use: Never    Drug use: Yes     Types: Marijuana, Amphetamines    Sexual activity: Defer       ALLERGIES  Dextromethorphan    PHYSICAL EXAM  Physical Exam  Vitals and nursing note reviewed.   Constitutional:       General: He is not in acute distress.     Appearance: Normal appearance. He is not ill-appearing.   HENT:      Head: Normocephalic and atraumatic.      Nose: Nose normal.      Mouth/Throat:      Mouth: Mucous membranes are moist.   Eyes:      Extraocular Movements: Extraocular movements intact.   Cardiovascular:      Rate and Rhythm: Normal rate.   Pulmonary:      Effort: Pulmonary effort is normal.   Abdominal:      General: There is no distension.   Musculoskeletal:         General: Normal range of motion.      Left hand: Laceration present.        Hands:       Cervical back: Normal range of motion and neck supple.      Comments: Subcutaneous laceration to dorsal surface of knuckle of index finger of left hand. Neurovascularly intact. Patient able to flex and extend finger against resistance. Brisk capillary refill.    Skin:     General: Skin is warm and dry.   Neurological:      General: No focal deficit present.      Mental Status: He is alert.   Psychiatric:         Mood and Affect: Mood normal. Mood is anxious.         Behavior: Behavior normal.       LAB RESULTS  Results for orders placed or performed during the hospital encounter of 03/16/24   High Sensitivity Troponin T    Specimen: Blood   Result Value Ref Range    HS Troponin T <6 <22 ng/L   Comprehensive Metabolic Panel    Specimen: Blood   Result Value Ref Range    Glucose 90 65 - 99 mg/dL    BUN 9 6 - 20 mg/dL    Creatinine 0.73 (L) 0.76 - 1.27 mg/dL    Sodium  137 136 - 145 mmol/L    Potassium 3.4 (L) 3.5 - 5.2 mmol/L    Chloride 101 98 - 107 mmol/L    CO2 28.0 22.0 - 29.0 mmol/L    Calcium 9.1 8.6 - 10.5 mg/dL    Total Protein 6.4 6.0 - 8.5 g/dL    Albumin 4.2 3.5 - 5.2 g/dL    ALT (SGPT) 15 1 - 41 U/L    AST (SGOT) 25 1 - 40 U/L    Alkaline Phosphatase 72 39 - 117 U/L    Total Bilirubin 0.8 0.0 - 1.2 mg/dL    Globulin 2.2 gm/dL    A/G Ratio 1.9 g/dL    BUN/Creatinine Ratio 12.3 7.0 - 25.0    Anion Gap 8.0 5.0 - 15.0 mmol/L    eGFR 104.8 >60.0 mL/min/1.73   Lipase    Specimen: Blood   Result Value Ref Range    Lipase 111 (H) 13 - 60 U/L   BNP    Specimen: Blood   Result Value Ref Range    proBNP 51.1 0.0 - 900.0 pg/mL   CBC Auto Differential    Specimen: Blood   Result Value Ref Range    WBC 9.37 3.40 - 10.80 10*3/mm3    RBC 4.00 (L) 4.14 - 5.80 10*6/mm3    Hemoglobin 13.4 13.0 - 17.7 g/dL    Hematocrit 38.2 37.5 - 51.0 %    MCV 95.5 79.0 - 97.0 fL    MCH 33.5 (H) 26.6 - 33.0 pg    MCHC 35.1 31.5 - 35.7 g/dL    RDW 12.2 (L) 12.3 - 15.4 %    RDW-SD 43.1 37.0 - 54.0 fl    MPV 8.7 6.0 - 12.0 fL    Platelets 300 140 - 450 10*3/mm3    Neutrophil % 58.5 42.7 - 76.0 %    Lymphocyte % 29.2 19.6 - 45.3 %    Monocyte % 9.7 5.0 - 12.0 %    Eosinophil % 1.8 0.3 - 6.2 %    Basophil % 0.6 0.0 - 1.5 %    Immature Grans % 0.2 0.0 - 0.5 %    Neutrophils, Absolute 5.47 1.70 - 7.00 10*3/mm3    Lymphocytes, Absolute 2.74 0.70 - 3.10 10*3/mm3    Monocytes, Absolute 0.91 (H) 0.10 - 0.90 10*3/mm3    Eosinophils, Absolute 0.17 0.00 - 0.40 10*3/mm3    Basophils, Absolute 0.06 0.00 - 0.20 10*3/mm3    Immature Grans, Absolute 0.02 0.00 - 0.05 10*3/mm3    nRBC 0.0 0.0 - 0.2 /100 WBC   High Sensitivity Troponin T 2Hr    Specimen: Blood   Result Value Ref Range    HS Troponin T 6 <22 ng/L    Troponin T Delta     ECG 12 Lead ED Triage Standing Order; Chest Pain   Result Value Ref Range    QT Interval 424 ms    QTC Interval 486 ms   ECG 12 Lead ED Triage Standing Order; Chest Pain   Result Value Ref  Pt with RRT called for hypoxia.  Reportedly with nausea/vomiting for which reglan was already given.  Told pt on max NRB had sat in the high 70s.  On arrival pt sitting upright in low 80s.  Placed pt in prone position and positioned comfortably with pillow.  After a few mins sat stable in low 90s.  Will give IV tyelnol.  Primary team told to give albuterol and if pts saturation creeping down give 40 lasix given that in COVID/ARDS it is preferential to keep pts volume down and he just received a contrast fluid load for CTA.  Pt reportedly already completed plaquenil and on steroids.  CTA done showing no PE.  Would make sure to check AM labs to monitor Cr in setting of CTA and if lasix is given. Range    QT Interval 436 ms    QTC Interval 470 ms   Green Top (Gel)   Result Value Ref Range    Extra Tube Hold for add-ons.    Lavender Top   Result Value Ref Range    Extra Tube hold for add-on    Gold Top - SST   Result Value Ref Range    Extra Tube Hold for add-ons.    Gray Top   Result Value Ref Range    Extra Tube Hold for add-ons.    Light Blue Top   Result Value Ref Range    Extra Tube Hold for add-ons.        If labs were ordered, I independently reviewed the results and considered them in treating the patient.    RADIOLOGY  XR Chest 1 View   Final Result   Impression:   No active disease.            Electronically Signed: Joe Barry MD     3/16/2024 12:39 AM EDT     Workstation ID: [id number]        [x] Radiologist's Report Reviewed:  I ordered and independently interpreted the above noted radiographic studies.  See radiologist's dictation for official interpretation.      PROCEDURES    Laceration Repair    Date/Time: 3/16/2024 12:30 AM    Performed by: Vidal Domingo PA  Authorized by: Bob Vick MD    Consent:     Consent obtained:  Verbal    Consent given by:  Patient    Risks discussed:  Infection, tendon damage, vascular damage, poor wound healing and nerve damage    Alternatives discussed:  No treatment  Anesthesia:     Anesthesia method:  None  Laceration details:     Location:  Finger    Finger location:  L index finger    Length (cm):  2    Depth (mm):  1  Exploration:     Hemostasis achieved with:  Direct pressure    Wound exploration: wound explored through full range of motion and entire depth of wound visualized      Contaminated: no    Treatment:     Area cleansed with:  Chlorhexidine    Amount of cleaning:  Extensive    Irrigation solution:  Sterile water    Irrigation volume:  250    Irrigation method:  Pressure wash  Skin repair:     Repair method:  Tissue adhesive  Approximation:     Approximation:  Close  Repair type:     Repair type:  Simple  Post-procedure details:     Dressing:   Sterile dressing    Procedure completion:  Tolerated      ECG 12 Lead ED Triage Standing Order; Chest Pain   Preliminary Result   Test Reason : ED Triage Standing Order~   Blood Pressure :   */*   mmHG   Vent. Rate :  70 BPM     Atrial Rate :  70 BPM      P-R Int : 208 ms          QRS Dur : 144 ms       QT Int : 436 ms       P-R-T Axes :  68 -72  35 degrees      QTc Int : 470 ms      Normal sinus rhythm   Left axis deviation   Right bundle branch block   Inferior infarct , age undetermined   Abnormal ECG   When compared with ECG of 16-MAR-2024 00:18, (Unconfirmed)   Inferior infarct is now present      Referred By: RAFAEL           Confirmed By:       ECG 12 Lead ED Triage Standing Order; Chest Pain   Preliminary Result   Test Reason : ED Triage Standing Order~   Blood Pressure :   */*   mmHG   Vent. Rate :  79 BPM     Atrial Rate :  79 BPM      P-R Int : 186 ms          QRS Dur : 148 ms       QT Int : 424 ms       P-R-T Axes :  76 -74  57 degrees      QTc Int : 486 ms      Normal sinus rhythm   Left axis deviation   Right bundle branch block   Abnormal ECG   When compared with ECG of 27-DEC-2023 10:23,   Vent. rate has increased BY  28 BPM   QT has lengthened      Referred By:            Confirmed By:           MEDICATIONS GIVEN IN ER    Medications   aspirin chewable tablet 324 mg (324 mg Oral Given 3/16/24 0118)       MEDICAL DECISION MAKING, PROGRESS, and CONSULTS   Medical Decision Making  Problems Addressed:  Laceration of left index finger with foreign body without damage to nail, initial encounter: complicated acute illness or injury  Nonspecific chest pain: complicated acute illness or injury    Amount and/or Complexity of Data Reviewed  Labs: ordered.  Radiology: ordered.  ECG/medicine tests: ordered.    Risk  OTC drugs.  Prescription drug management.        All labs have been independently reviewed by me.  All radiology studies have been interpreted by me and the radiologist dictating the report.  All EKG's  have been independently interpreted by me as well as and overseeing attending physician.    [] Discussed with radiology regarding test interpretation:    Discussion below represents my analysis of pertinent findings related to patient's condition, differential diagnosis, treatment plan and final disposition.    Differential diagnosis:  The differential diagnosis associated with the patient's presentation includes: Congestive heart failure (volume overload), acute coronary syndrome (STEMI/NSTEMI), pulmonary embolism, pneumothorax, rib contusions and fractures, intercostal muscle strains, costochondritis, pneumonia, URI, myocarditis and GERD.     Additional sources  Discussed/ obtained information from independent historians:   [] Spouse  [] Parent  [] Family member  [] Friend  [] EMS   [] Other:  External (non-ED) record review:   [] Inpatient record:   [x] Office record: Office visits with cardiology reviewed from 3/7/2024 where patient seen and evaluated with diagnosis of palpitations and chest pain   [] Outpatient record:   [] Prior Outpatient labs:   [] Prior Outpatient radiology:   [x] Primary Care record:PCP visit from 3/6/2024 reviewed   [] Outside ED record:   [] Other:   Patient's care impacted by:   [] Diabetes  [] Hypertension  [] Hyperlipidemia  [] Hypothyroidism   [] Coronary Artery Disease   [] COPD   [] Cancer   [] Obesity  [x] GERD   [] Tobacco Abuse   [] Substance Abuse    [x] Anxiety   [] Depression   [x] Other: Hiatal hernia, PTSD  Care significantly affected by Social Determinants of Health (housing and economic circumstances, unemployment)    [] Yes     [x] No   If yes, Patient's care significantly limited by  Social Determinants of Health including:   [] Inadequate housing   [] Low income   [] Alcoholism and drug addiction in family   [] Problems related to primary support group   [] Unemployment   [] Problems related to employment   [] Other Social Determinants of Health:     Shared decision  making:  I had a discussion with the patient/family regarding diagnosis, diagnostic results, treatment plan, and medications.  The patient/family indicated understanding of these instructions.  I spent adequate time at the bedside preceding discharge necessary to personally discuss the aftercare instructions, giving patient education, providing explanations of the results of our evaluations/findings, and my decision making to assure that the patient/family understand the plan of care.  Time was allotted to answer questions at that time and throughout the ED course.  Emphasis was placed on timely follow-up after discharge.  I also discussed the potential for the development of an acute emergent condition requiring further evaluation, admission, or even surgical intervention. I discussed that we found nothing during the visit today indicating the need for further workup, admission, or the presence of an unstable medical condition.  I encouraged the patient to return to the emergency department immediately for ANY concerns, worsening, new complaints, or if symptoms persist and unable to seek follow-up in a timely fashion.  The patient/family expressed understanding and agreement with this plan.  The patient will follow-up with primary care and cardiology for reevaluation.       Orders placed during this visit:  Orders Placed This Encounter   Procedures    Laceration Repair    XR Chest 1 View    [place] Draw    High Sensitivity Troponin T    Comprehensive Metabolic Panel    Lipase    BNP    CBC Auto Differential    High Sensitivity Troponin T 2Hr    Undress & Gown    Continuous Pulse Oximetry    ECG 12 Lead ED Triage Standing Order; Chest Pain    CBC & Differential    Green Top (Gel)    Lavender Top    Gold Top - SST    Gray Top    Light Blue Top       ED Course:    ED Course as of 03/16/24 1347   Sat Mar 16, 2024   0152 Labs studies were reviewed and directly interpreted by myself and demonstrated lipase 111 consistent  with previous results.  Remainder of labs without acute findings. [JG]   0152 Imaging of chest personally interpreted by myself with official read provided by radiology demonstrated no acute cardiopulmonary process.   [JG]   0152 Initial vitals and Telemetry tracing was reviewed and directly interpreted by myself demonstrating blood pressure 118/67, afebrile, heart rate 83, respirations 16 and oxygen saturation 94% on room air [JG]   0316 Initial and repeat EKGs personally interpreted by myself in addition to interpretation by attending without evidence of acute ischemic changes.  [JG]   1344 58 year old non-toxic appearing male presents to the ER with a finger laceration from his knife in addition to anxiety and chest pain because his wound was bleeding and he couldn't get it to stop. No acute or emergent findings demonstrated on physical exam. Simple laceration repaired as documented in procedural note. Chest pain work up without acute findings and patient already following with cardiology for chest pain and wearing event recorder. At time of discharge disposition patient is afebrile, nontoxic appearing, vital signs stable and able to maintain O2 sats of 97% on room air. Patient will be discharged home with symptomatic care and outpatient follow up. [JG]      ED Course User Index  [JG] Vidal Domingo PA            DIAGNOSIS  Final diagnoses:   Laceration of left index finger with foreign body without damage to nail, initial encounter   Nonspecific chest pain       DISPOSITION    ED Disposition       ED Disposition   Discharge    Condition   Stable    Comment   --               Please note that portions of this document were completed with voice recognition software.        Vidal Domingo PA  03/16/24 [id number]

## 2024-03-16 NOTE — DISCHARGE INSTRUCTIONS
Symptomatic care is recommended. Take all medications as prescribed and instructed. Follow up with your primary care and [facility] cardiology or return to Emergency Department with worsening of symptoms.

## 2024-03-17 LAB
QT INTERVAL: 424 MS
QT INTERVAL: 436 MS
QTC INTERVAL: 470 MS
QTC INTERVAL: 486 MS

## 2024-03-18 NOTE — TELEPHONE ENCOUNTER
2nd attempt     LOV 03/06/2024  NOV      Tried to reach patient no answer left voicemail to return call     RELAY:  We recently received your message to refill your medication(s).  Our records indicate that you did not schedule a follow up appointment with your provider at your last visit on 03/06/2024. The medication that you are on requires a follow up appointment for additional refills. Patient was to schedule on or around 12/20/2024 for Medicare wellness  If he is unable to keep his appointment we will not be able to provider further refills.  Once appointment has been scheduled please update message with date and time so we can process the request.  We will forward the message to the provider to review the refill request.

## 2024-03-19 RX ORDER — PANTOPRAZOLE SODIUM 40 MG/1
40 TABLET, DELAYED RELEASE ORAL 2 TIMES DAILY
Qty: 180 TABLET | Refills: 0 | Status: SHIPPED | OUTPATIENT
Start: 2024-03-19

## 2024-03-19 NOTE — TELEPHONE ENCOUNTER
3rd attempt      LOV 03/06/2024  NOV      Tried to reach patient no answer left voicemail to return call     RELAY:  We recently received your message to refill your medication(s).  Our records indicate that you did not schedule a follow up appointment with your provider at your last visit on 03/06/2024. The medication that you are on requires a follow up appointment for additional refills. Patient was to schedule on or around 12/20/2024 for Medicare wellness  If he is unable to keep his appointment we will not be able to provider further refills.  Once appointment has been scheduled please update message with date and time so we can process the request.  We will forward the message to the provider to review the refill request.

## 2024-03-28 ENCOUNTER — OFFICE VISIT (OUTPATIENT)
Dept: GASTROENTEROLOGY | Facility: CLINIC | Age: 59
End: 2024-03-28
Payer: MEDICARE

## 2024-03-28 VITALS
OXYGEN SATURATION: 98 % | BODY MASS INDEX: 20.99 KG/M2 | SYSTOLIC BLOOD PRESSURE: 110 MMHG | HEIGHT: 65 IN | DIASTOLIC BLOOD PRESSURE: 60 MMHG | WEIGHT: 126 LBS | HEART RATE: 78 BPM

## 2024-03-28 DIAGNOSIS — R13.10 DYSPHAGIA, UNSPECIFIED TYPE: Primary | ICD-10-CM

## 2024-03-28 DIAGNOSIS — K57.90 DIVERTICULOSIS: ICD-10-CM

## 2024-03-28 DIAGNOSIS — Z98.890 HISTORY OF NISSEN FUNDOPLICATION: ICD-10-CM

## 2024-03-28 DIAGNOSIS — Z98.890 HISTORY OF COLONOSCOPY: ICD-10-CM

## 2024-03-28 DIAGNOSIS — K29.70 GASTRITIS WITHOUT BLEEDING, UNSPECIFIED CHRONICITY, UNSPECIFIED GASTRITIS TYPE: ICD-10-CM

## 2024-03-28 DIAGNOSIS — K64.9 HEMORRHOIDS, UNSPECIFIED HEMORRHOID TYPE: ICD-10-CM

## 2024-03-28 DIAGNOSIS — K20.90 ESOPHAGITIS: ICD-10-CM

## 2024-03-28 PROBLEM — F41.1 GENERALIZED ANXIETY DISORDER: Status: RESOLVED | Noted: 2020-06-23 | Resolved: 2024-03-28

## 2024-03-28 PROBLEM — J30.2 SEASONAL ALLERGIES: Status: ACTIVE | Noted: 2020-06-23

## 2024-03-28 PROBLEM — F41.1 GENERALIZED ANXIETY DISORDER: Status: ACTIVE | Noted: 2020-06-23

## 2024-03-28 PROBLEM — G47.33 OBSTRUCTIVE SLEEP APNEA SYNDROME: Status: ACTIVE | Noted: 2020-06-23

## 2024-03-28 PROBLEM — R13.19 ESOPHAGEAL DYSPHAGIA: Status: ACTIVE | Noted: 2022-06-13

## 2024-03-28 PROBLEM — N40.0 BENIGN PROSTATIC HYPERPLASIA: Status: ACTIVE | Noted: 2020-06-23

## 2024-03-28 PROBLEM — K21.9 GASTROESOPHAGEAL REFLUX DISEASE WITHOUT ESOPHAGITIS: Status: ACTIVE | Noted: 2020-06-23

## 2024-03-28 PROBLEM — N52.9 ERECTILE DYSFUNCTION: Status: ACTIVE | Noted: 2020-06-23

## 2024-03-28 PROBLEM — Z72.0 TOBACCO ABUSE: Status: ACTIVE | Noted: 2020-06-23

## 2024-03-28 NOTE — PROGRESS NOTES
GASTROENTEROLOGY OFFICE NOTE    Aashish Maldonado  [id number]  1965    CARE TEAM  Patient Care Team:  Anna Gamez APRN as PCP - General (Nurse Practitioner)    Referring Provider: Amber Gould P*    Chief Complaint   Patient presents with    Heartburn     New patient    Difficulty Swallowing     New patient        HISTORY OF PRESENT ILLNESS:   Aashish Maldonado is a 58 y.o. male who presents to the clinic today as a referral from Amber BRYAN for evaluation regarding reflux with documentation patient has history of alcoholism resulting in reflux, hiatal hernia, strictures with history of Nissen fundoplication with documentation patient needs esophagus stretched with many choking episodes. He denies alcohol use for 9 years.     He reports difficulty swallowing solids that seems worse when feeling increased stress or anxiety.  He describes the sensation of his throat closing when experiencing difficulty swallowing.  Drinking warm cola is helpful.  He does not have difficulty swallowing liquids.  He reports difficulty swallowing medication.    With review of medical record as below he recalls dilation of esophagus in the past helpful for swallowing.    9/30/2015 colonoscopy per Dr. Nicole for screening revealed diverticulosis, hemorrhoids with recommendation to start high-fiber diet and repeat colonoscopy in 10 years.    EGD 6/24/2022 per Mario Corey at [facility] was referred by RANDI Smalls due to dysphagia, history of Nissen fundoplication at [facility], chronic reflux, weight loss ( using CBD Gummies which increased appetite as he has lost 50 pounds in less than a year,) revealed mildly tortuous esophagus, esophagitis without bleeding, evidence of Nissen fundoplication that appeared intact, mildly erythematous mucosa in gastric antrum, biopsies obtained, dilation to 15 mm with balloon dilator and lower third of the esophagus with moderate mucosal disruption.  Repeat upper  endoscopy in 6 weeks recommended but I do not believe repeat EGD was performed.  Biopsy of stomach revealed minimal chronic gastritis, negative for H. pylori, biopsy of stomach body revealed minimal chronic gastritis and negative for H. pylori, biopsy of esophagus without abnormality and negative for eosinophils and intestinal metaplasia.         Past Medical History:   Diagnosis Date    Acid reflux     ADHD     Benign prostatic hyperplasia 06/23/2020    Double vision     Erectile dysfunction 03/06/2024    Esophageal dysphagia 06/13/2022    Formatting of this note might be different from the original.   Added automatically from request for surgery [id number]      Gastroesophageal reflux disease without esophagitis 03/06/2024    Generalized anxiety disorder 03/06/2024    Hiatal hernia 03/06/2024    History of Nissen fundoplication 03/06/2024    Obstructive sleep apnea syndrome 06/23/2020    PTSD (post-traumatic stress disorder)     Seasonal allergies 06/23/2020    Urinary retention         Past Surgical History:   Procedure Laterality Date    COLONOSCOPY  09/30/2015    Dago Nicole MD(Repeat in 10years)    ESOPHAGUS SURGERY      EYE SURGERY      NISSEN FUNDOPLICATION      SHOULDER SURGERY Right     WRIST FUSION          Current Outpatient Medications on File Prior to Visit   Medication Sig    albuterol sulfate  (90 Base) MCG/ACT inhaler Inhale 2 puffs Every 6 (Six) Hours As Needed for Wheezing.    Budeson-Glycopyrrol-Formoterol (BREZTRI) 160-9-4.8 MCG/ACT aerosol inhaler Inhale 2 puffs 2 (Two) Times a Day.    busPIRone (BUSPAR) 10 MG tablet Take 1 tablet by mouth 3 (Three) Times a Day.    doxazosin (CARDURA) 4 MG tablet TAKE TWO TABLETS BY MOUTH ONCE NIGHTLY (Patient taking differently: Take 2 tablets by mouth Daily.)    fluticasone (FLONASE) 50 MCG/ACT nasal spray 2 sprays into the nostril(s) as directed by provider Daily. (Patient taking differently: 2 sprays into the nostril(s) as directed by provider Daily  "As Needed.)    meloxicam (Mobic) 7.5 MG tablet Take 1 tablet by mouth Daily. (Patient taking differently: Take 1 tablet by mouth Daily As Needed.)    nitroglycerin (Nitrostat) 0.4 MG SL tablet Place 1 tablet under the tongue Every 5 (Five) Minutes As Needed for Chest Pain. Take no more than 3 doses in 15 minutes.    pantoprazole (PROTONIX) 40 MG EC tablet TAKE 1 TABLET BY MOUTH TWICE A DAY    tadalafil (Cialis) 5 MG tablet Take 1 tablet by mouth Daily As Needed for Erectile Dysfunction. (Patient taking differently: Take 1 tablet by mouth Daily. Urinary dysfunction)     No current facility-administered medications on file prior to visit.       Allergies   Allergen Reactions    Dextromethorphan Hives       Family History   Problem Relation Age of Onset    Hearing loss Mother     Diabetes Father     Kidney failure Father     Cancer Sister     No Known Problems Maternal Grandmother     Heart disease Maternal Grandfather     Diabetes Paternal Grandmother     Alzheimer's disease Paternal Grandmother     Diabetes Paternal Grandfather     Alzheimer's disease Paternal Grandfather        Social History     Socioeconomic History    Marital status:    Tobacco Use    Smoking status: Every Day     Current packs/day: 0.50     Average packs/day: 0.5 packs/day for 40.0 years (20.0 ttl pk-yrs)     Types: Cigarettes    Smokeless tobacco: Never   Vaping Use    Vaping status: Never Used   Substance and Sexual Activity    Alcohol use: Never     Comment: 20yrs sober    Drug use: Yes     Types: Marijuana, Amphetamines    Sexual activity: Defer       PHYSICAL EXAM   /60 (BP Location: Left arm, Patient Position: Sitting, Cuff Size: Adult)   Pulse 78   Ht 165.1 cm (65\")   Wt 57.2 kg (126 lb)   SpO2 98%   BMI 20.97 kg/m²   Physical Exam  Constitutional:       General: He is not in acute distress.     Appearance: He is not toxic-appearing.   HENT:      Head: Normocephalic and atraumatic. No contusion.      Right Ear: External " ear normal.      Left Ear: External ear normal.   Eyes:      General: Lids are normal. No scleral icterus.        Right eye: No discharge.         Left eye: No discharge.      Extraocular Movements: Extraocular movements intact.   Neck:      Trachea: Trachea normal.      Comments: No visible mass  No visible adenopathy  Cardiovascular:      Rate and Rhythm: Normal rate.   Pulmonary:      Effort: No respiratory distress.      Comments: Symmetrical expansion    Musculoskeletal:      Comments: Symmetrical movement of upper extremities  Symmetrical movement of lower extremities  No visible deformities   Skin:     General: Skin is warm and dry.      Coloration: Skin is not jaundiced.   Neurological:      General: No focal deficit present.      Mental Status: He is alert and oriented to person, place, and time.   Psychiatric:         Mood and Affect: Mood normal.         Behavior: Behavior normal.         Thought Content: Thought content normal.         Results Review:  9/30/2015 colonoscopy per Dr. Nicole for screening revealed diverticulosis, hemorrhoids with recommendation to start high-fiber diet and repeat colonoscopy in 10 years.    6/24/2022 EGD Dr. Corey  Impression:                                     - Tortuous esophagus.                        - Granular mucosa chronic esophagitis with no                        bleeding. Biopsied.                        - Z-line regular, 40 cm from the incisors.                        - A Nissen fundoplication was found. The wrap appears                        intact.                        - Erythematous mucosa in the antrum. Biopsied.                        - Normal first portion of the duodenum and second                        portion of the duodenum.                        - Dilation performed in the lower third of the                        esophagus.  Recommendation:        - Discharge patient to home.                        - Soft diet today.                        -  Continue present medications.                        - Await pathology results.                        - Repeat upper endoscopy in 6 weeks for retreatment.                        - Return to physician assistant in 5 weeks.    Biopsy of stomach revealed minimal chronic gastritis, negative for H. pylori, biopsy of stomach body revealed minimal chronic gastritis and negative for H. pylori, biopsy of esophagus without abnormality and negative for eosinophils and intestinal metaplasia.  CMP          12/27/2023    10:04 3/6/2024    15:12 3/16/2024    01:04   CMP   Glucose 146  104  90    BUN 16  18  9    Creatinine 0.67  0.87  0.73    EGFR 107.6  99.4  104.8    Sodium 138  140  137    Potassium 3.8  3.9  3.4    Chloride 102  104  101    Calcium 9.1  9.1  9.1    Total Protein 6.6  6.3  6.4    Albumin 4.0  4.3  4.2    Globulin 2.6  2.0  2.2    Total Bilirubin 0.2  0.5  0.8    Alkaline Phosphatase 81  85  72    AST (SGOT) 18  19  25    ALT (SGPT) 19  18  15    Albumin/Globulin Ratio 1.5  2.2  1.9    BUN/Creatinine Ratio 23.9  20.7  12.3    Anion Gap 10.0  10.0  8.0      CBC          12/27/2023    10:04 3/6/2024    15:12 3/16/2024    01:04   CBC   WBC 8.92  6.58  9.37    RBC 4.59  4.29  4.00    Hemoglobin 15.3  14.1  13.4    Hematocrit 44.5  41.3  38.2    MCV 96.9  96.3  95.5    MCH 33.3  32.9  33.5    MCHC 34.4  34.1  35.1    RDW 12.5  12.2  12.2    Platelets 397  301  300        ASSESSMENT / PLAN  1. Dysphagia, unspecified type  2. Esophagitis  3. Gastritis without bleeding, unspecified chronicity, unspecified gastritis type  4. History of Nissen fundoplication  -I believe most recent EGD was completed 6/2022 as above with improvement in swallowing following dilation.  Repeat EGD with consideration for dilation per discretion of physician performing EGD.  -Continue pantoprazole 40 mg twice daily  -Consider discussing lifestyle modifications for reflux in the future  - avoiding NSAIDs could be helpful   -We briefly discussed  stress management and management of anxiety as it does seem as though he has more difficulty swallowing with increased stress and/or anxiety.  He reports taking BuSpar with some improvement and possible use of marijuana (we discussed marijuana could cause worsening GI symptoms).  Consider referral to behavioral health in the future.  -Consider evaluation by speech-language pathology if he continues to have description of his throat closing following EGD  5. History of colonoscopy  6. Diverticulosis  7. Hemorrhoids, unspecified hemorrhoid type  - avoid constipation, consider discussing high fiber diet at follow up, screening colonoscopy due 9/30/2025.       Return for Follow-up after EGD.    Riri Mendez, APRN  03/28/2024

## 2024-03-28 NOTE — PROGRESS NOTES
GASTROENTEROLOGY OFFICE NOTE    Aashish Maldonado  [id number]  1965    CARE TEAM  Patient Care Team:  nAna Gamez APRN as PCP - General (Nurse Practitioner)    Referring Provider: Amber Gould P*    Chief Complaint   Patient presents with    Heartburn     New patient        HISTORY OF PRESENT ILLNESS:   Aashish Maldonado is a 58 y.o. male who presents to the clinic today for ***    Past Medical History:   Diagnosis Date    Acid reflux     ADHD     Double vision     PTSD (post-traumatic stress disorder)     Urinary retention         Past Surgical History:   Procedure Laterality Date    COLONOSCOPY  09/30/2015    Dago Nicole MD(Repeat in 10years)    ESOPHAGUS SURGERY      EYE SURGERY      SHOULDER SURGERY Right     WRIST FUSION          Current Outpatient Medications on File Prior to Visit   Medication Sig    albuterol sulfate  (90 Base) MCG/ACT inhaler Inhale 2 puffs Every 6 (Six) Hours As Needed for Wheezing.    Budeson-Glycopyrrol-Formoterol (BREZTRI) 160-9-4.8 MCG/ACT aerosol inhaler Inhale 2 puffs 2 (Two) Times a Day.    busPIRone (BUSPAR) 10 MG tablet Take 1 tablet by mouth 3 (Three) Times a Day.    doxazosin (CARDURA) 4 MG tablet TAKE TWO TABLETS BY MOUTH ONCE NIGHTLY (Patient taking differently: Take 2 tablets by mouth Daily.)    fluticasone (FLONASE) 50 MCG/ACT nasal spray 2 sprays into the nostril(s) as directed by provider Daily. (Patient taking differently: 2 sprays into the nostril(s) as directed by provider Daily As Needed.)    meloxicam (Mobic) 7.5 MG tablet Take 1 tablet by mouth Daily. (Patient taking differently: Take 1 tablet by mouth Daily As Needed.)    nitroglycerin (Nitrostat) 0.4 MG SL tablet Place 1 tablet under the tongue Every 5 (Five) Minutes As Needed for Chest Pain. Take no more than 3 doses in 15 minutes.    pantoprazole (PROTONIX) 40 MG EC tablet TAKE 1 TABLET BY MOUTH TWICE A DAY    tadalafil (Cialis) 5 MG tablet Take 1 tablet by mouth Daily As Needed for Erectile  Dysfunction. (Patient taking differently: Take 1 tablet by mouth Daily. Urinary dysfunction)     No current facility-administered medications on file prior to visit.       Allergies   Allergen Reactions    Dextromethorphan Hives       Family History   Problem Relation Age of Onset    Hearing loss Mother     Diabetes Father     Kidney failure Father     Cancer Sister     No Known Problems Maternal Grandmother     Heart disease Maternal Grandfather     Diabetes Paternal Grandmother     Alzheimer's disease Paternal Grandmother     Diabetes Paternal Grandfather     Alzheimer's disease Paternal Grandfather        Social History     Socioeconomic History    Marital status:    Tobacco Use    Smoking status: Every Day     Current packs/day: 0.50     Average packs/day: 0.5 packs/day for 40.0 years (20.0 ttl pk-yrs)     Types: Cigarettes    Smokeless tobacco: Never   Vaping Use    Vaping status: Never Used   Substance and Sexual Activity    Alcohol use: Never     Comment: 20yrs sober    Drug use: Yes     Types: Marijuana, Amphetamines    Sexual activity: Defer       PHYSICAL EXAM   There were no vitals taken for this visit.  Physical Exam    Results Review:  ***  {Ambulatory Labs:31945}    ASSESSMENT / PLAN  There are no diagnoses linked to this encounter.    No follow-ups on file.    Elena Lorenzo MA  03/28/2024

## 2024-06-26 DIAGNOSIS — K20.90 ESOPHAGITIS: ICD-10-CM

## 2024-06-26 RX ORDER — PANTOPRAZOLE SODIUM 40 MG/1
40 TABLET, DELAYED RELEASE ORAL 2 TIMES DAILY
Qty: 180 TABLET | Refills: 0 | Status: SHIPPED | OUTPATIENT
Start: 2024-06-26

## 2024-08-29 DIAGNOSIS — N52.9 ERECTILE DYSFUNCTION, UNSPECIFIED ERECTILE DYSFUNCTION TYPE: ICD-10-CM

## 2024-08-29 DIAGNOSIS — R39.9 LOWER URINARY TRACT SYMPTOMS (LUTS): ICD-10-CM

## 2024-08-29 RX ORDER — TADALAFIL 5 MG/1
5 TABLET ORAL 2 TIMES DAILY
Qty: 30 TABLET | Refills: 2 | Status: CANCELLED | OUTPATIENT
Start: 2024-08-29

## 2024-08-29 RX ORDER — DOXAZOSIN 4 MG/1
8 TABLET ORAL NIGHTLY
Qty: 90 TABLET | Refills: 1 | Status: CANCELLED | OUTPATIENT
Start: 2024-08-29

## 2024-08-30 NOTE — TELEPHONE ENCOUNTER
Doxazosin prescribed by urology.  They have recommended follow-up.  Patient cannot take Cialis and nitrates.